# Patient Record
Sex: MALE | Race: BLACK OR AFRICAN AMERICAN | Employment: OTHER | URBAN - METROPOLITAN AREA
[De-identification: names, ages, dates, MRNs, and addresses within clinical notes are randomized per-mention and may not be internally consistent; named-entity substitution may affect disease eponyms.]

---

## 2017-09-11 ENCOUNTER — HOSPITAL ENCOUNTER (OUTPATIENT)
Age: 37
Discharge: HOME | End: 2017-09-11
Payer: COMMERCIAL

## 2017-09-11 ENCOUNTER — OFFICE VISIT (OUTPATIENT)
Dept: FAMILY MEDICINE CLINIC | Age: 37
End: 2017-09-11

## 2017-09-11 ENCOUNTER — HOSPITAL ENCOUNTER (OUTPATIENT)
Dept: GENERAL RADIOLOGY | Age: 37
Discharge: HOME | End: 2017-09-11
Payer: COMMERCIAL

## 2017-09-11 VITALS
HEIGHT: 72 IN | SYSTOLIC BLOOD PRESSURE: 110 MMHG | HEART RATE: 76 BPM | OXYGEN SATURATION: 98 % | WEIGHT: 199 LBS | TEMPERATURE: 98.2 F | BODY MASS INDEX: 26.95 KG/M2 | RESPIRATION RATE: 20 BRPM | DIASTOLIC BLOOD PRESSURE: 64 MMHG

## 2017-09-11 DIAGNOSIS — G89.29 CHRONIC LOW BACK PAIN WITH RIGHT-SIDED SCIATICA, UNSPECIFIED BACK PAIN LATERALITY: Primary | ICD-10-CM

## 2017-09-11 DIAGNOSIS — M25.551 RIGHT HIP PAIN: ICD-10-CM

## 2017-09-11 DIAGNOSIS — M54.41 CHRONIC LOW BACK PAIN WITH RIGHT-SIDED SCIATICA, UNSPECIFIED BACK PAIN LATERALITY: Primary | ICD-10-CM

## 2017-09-11 PROCEDURE — 99203 OFFICE O/P NEW LOW 30 MIN: CPT | Performed by: FAMILY MEDICINE

## 2017-09-11 PROCEDURE — 73521 X-RAY EXAM HIPS BI 2 VIEWS: CPT

## 2017-09-11 ASSESSMENT — PATIENT HEALTH QUESTIONNAIRE - PHQ9
SUM OF ALL RESPONSES TO PHQ9 QUESTIONS 1 & 2: 0
2. FEELING DOWN, DEPRESSED OR HOPELESS: 0
1. LITTLE INTEREST OR PLEASURE IN DOING THINGS: 0
SUM OF ALL RESPONSES TO PHQ QUESTIONS 1-9: 0

## 2017-09-15 ASSESSMENT — ENCOUNTER SYMPTOMS
COUGH: 0
CHEST TIGHTNESS: 0
BACK PAIN: 1
WHEEZING: 0
CONSTIPATION: 0
DIARRHEA: 0
SHORTNESS OF BREATH: 0
ABDOMINAL PAIN: 0
APNEA: 0
VOMITING: 0

## 2017-09-21 PROBLEM — M51.36 LUMBAR DEGENERATIVE DISC DISEASE: Status: ACTIVE | Noted: 2017-09-21

## 2017-09-21 PROBLEM — M54.16 LUMBAR RADICULAR PAIN: Status: ACTIVE | Noted: 2017-09-21

## 2017-09-21 PROBLEM — M51.369 LUMBAR DEGENERATIVE DISC DISEASE: Status: ACTIVE | Noted: 2017-09-21

## 2019-11-19 ENCOUNTER — OFFICE VISIT (OUTPATIENT)
Dept: FAMILY MEDICINE CLINIC | Age: 39
End: 2019-11-19
Payer: COMMERCIAL

## 2019-11-19 ENCOUNTER — HOSPITAL ENCOUNTER (OUTPATIENT)
Dept: GENERAL RADIOLOGY | Age: 39
Discharge: HOME | End: 2019-11-21
Payer: COMMERCIAL

## 2019-11-19 ENCOUNTER — HOSPITAL ENCOUNTER (OUTPATIENT)
Dept: LAB | Age: 39
Discharge: HOME | End: 2019-11-19
Payer: COMMERCIAL

## 2019-11-19 ENCOUNTER — HOSPITAL ENCOUNTER (OUTPATIENT)
Age: 39
Discharge: HOME | End: 2019-11-21
Payer: MEDICARE

## 2019-11-19 VITALS
HEIGHT: 72 IN | OXYGEN SATURATION: 97 % | WEIGHT: 208 LBS | TEMPERATURE: 97 F | HEART RATE: 115 BPM | RESPIRATION RATE: 18 BRPM | BODY MASS INDEX: 28.17 KG/M2 | SYSTOLIC BLOOD PRESSURE: 114 MMHG | DIASTOLIC BLOOD PRESSURE: 70 MMHG

## 2019-11-19 DIAGNOSIS — M25.521 RIGHT ELBOW PAIN: Primary | ICD-10-CM

## 2019-11-19 DIAGNOSIS — R10.33 ACUTE PERIUMBILICAL PAIN: ICD-10-CM

## 2019-11-19 DIAGNOSIS — M25.521 RIGHT ELBOW PAIN: ICD-10-CM

## 2019-11-19 LAB
ALBUMIN SERPL-MCNC: 4.5 G/DL (ref 3.5–4.6)
ALP BLD-CCNC: 88 U/L (ref 35–104)
ALT SERPL-CCNC: 35 U/L (ref 0–41)
AMYLASE: 44 U/L (ref 22–93)
ANION GAP SERPL CALCULATED.3IONS-SCNC: 12 MEQ/L (ref 9–15)
AST SERPL-CCNC: 25 U/L (ref 0–40)
BASOPHILS ABSOLUTE: 0 K/UL (ref 0–0.2)
BASOPHILS RELATIVE PERCENT: 0.8 %
BILIRUB SERPL-MCNC: 2.2 MG/DL (ref 0.2–0.7)
BUN BLDV-MCNC: 14 MG/DL (ref 6–20)
CALCIUM SERPL-MCNC: 9.4 MG/DL (ref 8.5–9.9)
CHLORIDE BLD-SCNC: 102 MEQ/L (ref 95–107)
CO2: 26 MEQ/L (ref 20–31)
CREAT SERPL-MCNC: 0.69 MG/DL (ref 0.7–1.2)
EOSINOPHILS ABSOLUTE: 0.1 K/UL (ref 0–0.7)
EOSINOPHILS RELATIVE PERCENT: 1.9 %
GFR AFRICAN AMERICAN: >60
GFR NON-AFRICAN AMERICAN: >60
GLOBULIN: 3.2 G/DL (ref 2.3–3.5)
GLUCOSE BLD-MCNC: 97 MG/DL (ref 70–99)
HCT VFR BLD CALC: 53.4 % (ref 42–52)
HEMOGLOBIN: 18.4 G/DL (ref 14–18)
LIPASE: 27 U/L (ref 12–95)
LYMPHOCYTES ABSOLUTE: 1.4 K/UL (ref 1–4.8)
LYMPHOCYTES RELATIVE PERCENT: 31.4 %
MCH RBC QN AUTO: 36.1 PG (ref 27–31.3)
MCHC RBC AUTO-ENTMCNC: 34.5 % (ref 33–37)
MCV RBC AUTO: 104.8 FL (ref 80–100)
MONOCYTES ABSOLUTE: 0.5 K/UL (ref 0.2–0.8)
MONOCYTES RELATIVE PERCENT: 11.9 %
NEUTROPHILS ABSOLUTE: 2.5 K/UL (ref 1.4–6.5)
NEUTROPHILS RELATIVE PERCENT: 54 %
PDW BLD-RTO: 13.4 % (ref 11.5–14.5)
PLATELET # BLD: 275 K/UL (ref 130–400)
POTASSIUM SERPL-SCNC: 4.3 MEQ/L (ref 3.4–4.9)
RBC # BLD: 5.1 M/UL (ref 4.7–6.1)
SODIUM BLD-SCNC: 140 MEQ/L (ref 135–144)
TOTAL PROTEIN: 7.7 G/DL (ref 6.3–8)
WBC # BLD: 4.6 K/UL (ref 4.8–10.8)

## 2019-11-19 PROCEDURE — 82150 ASSAY OF AMYLASE: CPT

## 2019-11-19 PROCEDURE — 36415 COLL VENOUS BLD VENIPUNCTURE: CPT

## 2019-11-19 PROCEDURE — 80053 COMPREHEN METABOLIC PANEL: CPT

## 2019-11-19 PROCEDURE — 73080 X-RAY EXAM OF ELBOW: CPT

## 2019-11-19 PROCEDURE — 99213 OFFICE O/P EST LOW 20 MIN: CPT | Performed by: PHYSICIAN ASSISTANT

## 2019-11-19 PROCEDURE — 83690 ASSAY OF LIPASE: CPT

## 2019-11-19 PROCEDURE — 85025 COMPLETE CBC W/AUTO DIFF WBC: CPT

## 2019-11-19 RX ORDER — CEPHALEXIN 500 MG/1
500 CAPSULE ORAL 3 TIMES DAILY
Qty: 30 CAPSULE | Refills: 0 | Status: SHIPPED | OUTPATIENT
Start: 2019-11-19 | End: 2019-11-29

## 2019-11-19 ASSESSMENT — PATIENT HEALTH QUESTIONNAIRE - PHQ9
2. FEELING DOWN, DEPRESSED OR HOPELESS: 0
SUM OF ALL RESPONSES TO PHQ QUESTIONS 1-9: 0
1. LITTLE INTEREST OR PLEASURE IN DOING THINGS: 0
SUM OF ALL RESPONSES TO PHQ9 QUESTIONS 1 & 2: 0
SUM OF ALL RESPONSES TO PHQ QUESTIONS 1-9: 0

## 2019-11-19 ASSESSMENT — ENCOUNTER SYMPTOMS
ABDOMINAL DISTENTION: 0
VOMITING: 0
SHORTNESS OF BREATH: 0
NAUSEA: 0
DIARRHEA: 0
COUGH: 0
CONSTIPATION: 0
BLOOD IN STOOL: 0
ABDOMINAL PAIN: 1

## 2019-11-20 ENCOUNTER — TELEPHONE (OUTPATIENT)
Dept: FAMILY MEDICINE CLINIC | Age: 39
End: 2019-11-20

## 2019-11-25 ENCOUNTER — TELEPHONE (OUTPATIENT)
Dept: FAMILY MEDICINE CLINIC | Age: 39
End: 2019-11-25

## 2019-11-25 ENCOUNTER — OFFICE VISIT (OUTPATIENT)
Dept: FAMILY MEDICINE CLINIC | Age: 39
End: 2019-11-25
Payer: MEDICARE

## 2019-11-25 ENCOUNTER — HOSPITAL ENCOUNTER (OUTPATIENT)
Dept: CT IMAGING | Age: 39
Discharge: HOME | End: 2019-11-27
Payer: MEDICARE

## 2019-11-25 VITALS
OXYGEN SATURATION: 96 % | TEMPERATURE: 97.8 F | WEIGHT: 213.4 LBS | DIASTOLIC BLOOD PRESSURE: 78 MMHG | BODY MASS INDEX: 28.91 KG/M2 | HEIGHT: 72 IN | SYSTOLIC BLOOD PRESSURE: 118 MMHG | RESPIRATION RATE: 16 BRPM | HEART RATE: 75 BPM

## 2019-11-25 DIAGNOSIS — D75.89 MACROCYTOSIS: ICD-10-CM

## 2019-11-25 DIAGNOSIS — N20.0 NEPHROLITHIASIS: ICD-10-CM

## 2019-11-25 DIAGNOSIS — R10.31 ACUTE RIGHT LOWER QUADRANT PAIN: ICD-10-CM

## 2019-11-25 DIAGNOSIS — Q63.9 RENAL ANOMALY: ICD-10-CM

## 2019-11-25 DIAGNOSIS — L03.114 LEFT ARM CELLULITIS: ICD-10-CM

## 2019-11-25 DIAGNOSIS — K52.9 ENTERITIS: ICD-10-CM

## 2019-11-25 DIAGNOSIS — R10.31 ACUTE RIGHT LOWER QUADRANT PAIN: Primary | ICD-10-CM

## 2019-11-25 DIAGNOSIS — N30.00 ACUTE CYSTITIS WITHOUT HEMATURIA: Primary | ICD-10-CM

## 2019-11-25 PROCEDURE — 99214 OFFICE O/P EST MOD 30 MIN: CPT | Performed by: FAMILY MEDICINE

## 2019-11-25 PROCEDURE — 6360000004 HC RX CONTRAST MEDICATION: Performed by: FAMILY MEDICINE

## 2019-11-25 PROCEDURE — 2500000003 HC RX 250 WO HCPCS: Performed by: FAMILY MEDICINE

## 2019-11-25 PROCEDURE — 74177 CT ABD & PELVIS W/CONTRAST: CPT

## 2019-11-25 RX ORDER — CIPROFLOXACIN 500 MG/1
500 TABLET, FILM COATED ORAL 2 TIMES DAILY
Qty: 20 TABLET | Refills: 0 | Status: SHIPPED | OUTPATIENT
Start: 2019-11-25 | End: 2019-12-05

## 2019-11-25 RX ORDER — METRONIDAZOLE 500 MG/1
500 TABLET ORAL 3 TIMES DAILY
Qty: 30 TABLET | Refills: 0 | Status: SHIPPED | OUTPATIENT
Start: 2019-11-25 | End: 2019-12-05

## 2019-11-25 RX ORDER — TAMSULOSIN HYDROCHLORIDE 0.4 MG/1
0.4 CAPSULE ORAL DAILY
Qty: 30 CAPSULE | Refills: 0 | Status: SHIPPED | OUTPATIENT
Start: 2019-11-25 | End: 2019-12-23 | Stop reason: ALTCHOICE

## 2019-11-25 RX ADMIN — IOPAMIDOL 100 ML: 612 INJECTION, SOLUTION INTRAVENOUS at 13:00

## 2019-11-25 RX ADMIN — BARIUM SULFATE 450 ML: 20 SUSPENSION ORAL at 11:57

## 2019-11-25 ASSESSMENT — ENCOUNTER SYMPTOMS
SHORTNESS OF BREATH: 0
HEMATOCHEZIA: 0
ABDOMINAL PAIN: 1
BLOOD IN STOOL: 0
BELCHING: 0
VOMITING: 0
CHEST TIGHTNESS: 0
CONSTIPATION: 0
FLATUS: 0
NAUSEA: 0
APNEA: 0
COUGH: 0
DIARRHEA: 0

## 2019-11-26 NOTE — TELEPHONE ENCOUNTER
Did The patient come in to leave a urine sample? I believe he is aware of the CT scan results.  I will have the patient discontinue his Keflex if he is going to start antibiotics sent in yesterday

## 2019-12-02 ENCOUNTER — HOSPITAL ENCOUNTER (OUTPATIENT)
Dept: ULTRASOUND IMAGING | Age: 39
Discharge: HOME | End: 2019-12-04
Payer: MEDICARE

## 2019-12-02 DIAGNOSIS — Q63.9 RENAL ANOMALY: ICD-10-CM

## 2019-12-02 PROCEDURE — 76775 US EXAM ABDO BACK WALL LIM: CPT

## 2019-12-11 ENCOUNTER — TELEPHONE (OUTPATIENT)
Dept: UROLOGY | Age: 39
End: 2019-12-11

## 2019-12-11 DIAGNOSIS — N20.0 KIDNEY STONE: Primary | ICD-10-CM

## 2019-12-16 ENCOUNTER — HOSPITAL ENCOUNTER (OUTPATIENT)
Dept: GENERAL RADIOLOGY | Age: 39
Discharge: HOME | End: 2019-12-18
Payer: MEDICARE

## 2019-12-16 ENCOUNTER — OFFICE VISIT (OUTPATIENT)
Dept: UROLOGY | Age: 39
End: 2019-12-16
Payer: MEDICARE

## 2019-12-16 VITALS
WEIGHT: 205 LBS | HEIGHT: 72 IN | HEART RATE: 77 BPM | BODY MASS INDEX: 27.77 KG/M2 | DIASTOLIC BLOOD PRESSURE: 82 MMHG | SYSTOLIC BLOOD PRESSURE: 118 MMHG

## 2019-12-16 DIAGNOSIS — N20.0 KIDNEY STONE: Primary | ICD-10-CM

## 2019-12-16 DIAGNOSIS — R31.29 MICROHEMATURIA: ICD-10-CM

## 2019-12-16 DIAGNOSIS — N20.0 KIDNEY STONE: ICD-10-CM

## 2019-12-16 LAB
BILIRUBIN, POC: ABNORMAL
BLOOD URINE, POC: ABNORMAL
CLARITY, POC: CLEAR
COLOR, POC: YELLOW
GLUCOSE URINE, POC: ABNORMAL
KETONES, POC: ABNORMAL
LEUKOCYTE EST, POC: ABNORMAL
NITRITE, POC: ABNORMAL
PH, POC: 7
PROTEIN, POC: ABNORMAL
SPECIFIC GRAVITY, POC: 1.02
UROBILINOGEN, POC: 0.2

## 2019-12-16 PROCEDURE — 81003 URINALYSIS AUTO W/O SCOPE: CPT | Performed by: UROLOGY

## 2019-12-16 PROCEDURE — 74018 RADEX ABDOMEN 1 VIEW: CPT

## 2019-12-16 PROCEDURE — 99203 OFFICE O/P NEW LOW 30 MIN: CPT | Performed by: UROLOGY

## 2019-12-18 LAB — URINE CULTURE, ROUTINE: NORMAL

## 2019-12-23 ENCOUNTER — PROCEDURE VISIT (OUTPATIENT)
Dept: UROLOGY | Age: 39
End: 2019-12-23
Payer: MEDICARE

## 2019-12-23 VITALS
WEIGHT: 205 LBS | HEART RATE: 61 BPM | SYSTOLIC BLOOD PRESSURE: 108 MMHG | BODY MASS INDEX: 27.77 KG/M2 | DIASTOLIC BLOOD PRESSURE: 76 MMHG | HEIGHT: 72 IN

## 2019-12-23 DIAGNOSIS — R33.9 URINARY RETENTION: ICD-10-CM

## 2019-12-23 DIAGNOSIS — R31.29 MICROHEMATURIA: Primary | ICD-10-CM

## 2019-12-23 LAB
BILIRUBIN, POC: NORMAL
BLOOD URINE, POC: NORMAL
CLARITY, POC: CLEAR
COLOR, POC: YELLOW
GLUCOSE URINE, POC: NORMAL
KETONES, POC: NORMAL
LEUKOCYTE EST, POC: NORMAL
NITRITE, POC: NORMAL
PH, POC: 6.5
POST VOID RESIDUAL (PVR): 134 ML
PROTEIN, POC: NORMAL
SPECIFIC GRAVITY, POC: <=1.005
UROBILINOGEN, POC: 0.2

## 2019-12-23 PROCEDURE — 81003 URINALYSIS AUTO W/O SCOPE: CPT | Performed by: UROLOGY

## 2019-12-23 PROCEDURE — 51798 US URINE CAPACITY MEASURE: CPT | Performed by: UROLOGY

## 2019-12-23 PROCEDURE — 52000 CYSTOURETHROSCOPY: CPT | Performed by: UROLOGY

## 2019-12-23 RX ORDER — SULFAMETHOXAZOLE AND TRIMETHOPRIM 800; 160 MG/1; MG/1
1 TABLET ORAL ONCE
Qty: 1 TABLET | Refills: 0 | COMMUNITY
Start: 2019-12-23 | End: 2019-12-23

## 2020-01-07 ENCOUNTER — OFFICE VISIT (OUTPATIENT)
Dept: FAMILY MEDICINE CLINIC | Age: 40
End: 2020-01-07
Payer: MEDICARE

## 2020-01-07 ENCOUNTER — HOSPITAL ENCOUNTER (OUTPATIENT)
Dept: LAB | Age: 40
Discharge: HOME | End: 2020-01-07
Payer: MEDICARE

## 2020-01-07 ENCOUNTER — HOSPITAL ENCOUNTER (OUTPATIENT)
Dept: CT IMAGING | Age: 40
Discharge: HOME | End: 2020-01-09
Payer: MEDICARE

## 2020-01-07 VITALS
DIASTOLIC BLOOD PRESSURE: 80 MMHG | RESPIRATION RATE: 14 BRPM | SYSTOLIC BLOOD PRESSURE: 128 MMHG | HEIGHT: 72 IN | BODY MASS INDEX: 28.53 KG/M2 | HEART RATE: 73 BPM | OXYGEN SATURATION: 98 % | WEIGHT: 210.6 LBS | TEMPERATURE: 97.4 F

## 2020-01-07 LAB
BASOPHILS ABSOLUTE: 0 K/UL (ref 0–0.2)
BASOPHILS RELATIVE PERCENT: 0.7 %
EOSINOPHILS ABSOLUTE: 0.1 K/UL (ref 0–0.7)
EOSINOPHILS RELATIVE PERCENT: 3 %
FOLATE: >20 NG/ML (ref 7.3–26.1)
HCT VFR BLD CALC: 49.1 % (ref 42–52)
HEMOGLOBIN: 17 G/DL (ref 14–18)
LYMPHOCYTES ABSOLUTE: 1.4 K/UL (ref 1–4.8)
LYMPHOCYTES RELATIVE PERCENT: 30.8 %
MCH RBC QN AUTO: 36.4 PG (ref 27–31.3)
MCHC RBC AUTO-ENTMCNC: 34.7 % (ref 33–37)
MCV RBC AUTO: 104.8 FL (ref 80–100)
MONOCYTES ABSOLUTE: 0.3 K/UL (ref 0.2–0.8)
MONOCYTES RELATIVE PERCENT: 5.8 %
NEUTROPHILS ABSOLUTE: 2.7 K/UL (ref 1.4–6.5)
NEUTROPHILS RELATIVE PERCENT: 59.7 %
PDW BLD-RTO: 13.3 % (ref 11.5–14.5)
PLATELET # BLD: 259 K/UL (ref 130–400)
RBC # BLD: 4.68 M/UL (ref 4.7–6.1)
VITAMIN B-12: 996 PG/ML (ref 232–1245)
WBC # BLD: 4.5 K/UL (ref 4.8–10.8)

## 2020-01-07 PROCEDURE — 82746 ASSAY OF FOLIC ACID SERUM: CPT

## 2020-01-07 PROCEDURE — 36415 COLL VENOUS BLD VENIPUNCTURE: CPT

## 2020-01-07 PROCEDURE — 85025 COMPLETE CBC W/AUTO DIFF WBC: CPT

## 2020-01-07 PROCEDURE — 99214 OFFICE O/P EST MOD 30 MIN: CPT | Performed by: FAMILY MEDICINE

## 2020-01-07 PROCEDURE — 2500000003 HC RX 250 WO HCPCS: Performed by: FAMILY MEDICINE

## 2020-01-07 PROCEDURE — 74177 CT ABD & PELVIS W/CONTRAST: CPT

## 2020-01-07 PROCEDURE — 6360000004 HC RX CONTRAST MEDICATION: Performed by: FAMILY MEDICINE

## 2020-01-07 PROCEDURE — 82607 VITAMIN B-12: CPT

## 2020-01-07 RX ADMIN — IOPAMIDOL 100 ML: 755 INJECTION, SOLUTION INTRAVENOUS at 18:16

## 2020-01-07 RX ADMIN — BARIUM SULFATE 450 ML: 20 SUSPENSION ORAL at 17:20

## 2020-01-07 ASSESSMENT — ENCOUNTER SYMPTOMS
CONSTIPATION: 0
ABDOMINAL PAIN: 1
DIARRHEA: 0
COUGH: 0
VOMITING: 0
CHEST TIGHTNESS: 0
SHORTNESS OF BREATH: 0
BLOOD IN STOOL: 0
NAUSEA: 0
APNEA: 0

## 2020-01-07 ASSESSMENT — PATIENT HEALTH QUESTIONNAIRE - PHQ9
SUM OF ALL RESPONSES TO PHQ QUESTIONS 1-9: 0
2. FEELING DOWN, DEPRESSED OR HOPELESS: 0
SUM OF ALL RESPONSES TO PHQ9 QUESTIONS 1 & 2: 0
1. LITTLE INTEREST OR PLEASURE IN DOING THINGS: 0
SUM OF ALL RESPONSES TO PHQ QUESTIONS 1-9: 0

## 2020-01-07 NOTE — PROGRESS NOTES
Subjective:      Patient ID: Marleny Carmichael is a 44 y.o. male who presents today for:     Chief Complaint   Patient presents with    Abdominal Pain     Patient presents with right sided pain that radiates to the upper part of his abdomen, is worse after walking his dog or being at work all day. HPI  Patient is a 59-year-old male presents today to follow-up on right-sided abdominal pain. CT scan ordered 1 month ago showed thickening within the small bowel on the right side. Patient was placed on antibiotics and had mild/moderate improvement but did not follow up or go  to the emergency room as directed. He  states the pain is currently a dull pain that is graded as a 3 out of 10. He denies any diarrhea, constipation or blood in his stool but states stools are always somewhat soft. He admits to some mild anal leakage but he denies any fever or chills. He states his symptoms are worse with activity or after work  Patient also states that he has been having recurrent right elbow pain. Antibiotics were helpful in resolving cellulitis but patient continues to have discomfort especially after using arm all day at work. He denies any numbness or tingling or swelling of the joint  Past Medical History:   Diagnosis Date    Anxiety     Depression     Hypertension     Lumbar degenerative disc disease 9/21/2017     Past Surgical History:   Procedure Laterality Date    BACK SURGERY N/A 2014    DENTAL SURGERY  2010    all    KNEE ARTHROSCOPY  1995    rt knee     History reviewed. No pertinent family history.   Social History     Socioeconomic History    Marital status: Single     Spouse name: Not on file    Number of children: Not on file    Years of education: Not on file    Highest education level: Not on file   Occupational History    Not on file   Social Needs    Financial resource strain: Not on file    Food insecurity:     Worry: Not on file     Inability: Not on file    Transportation needs: Medical: Not on file     Non-medical: Not on file   Tobacco Use    Smoking status: Current Every Day Smoker    Smokeless tobacco: Never Used    Tobacco comment: smokes marijuana every day   Substance and Sexual Activity    Alcohol use: Yes     Comment: 40-48 oz malt liquor/day    Drug use: Yes     Types: Marijuana     Comment: daily    Sexual activity: Not Currently     Partners: Female   Lifestyle    Physical activity:     Days per week: Not on file     Minutes per session: Not on file    Stress: Not on file   Relationships    Social connections:     Talks on phone: Not on file     Gets together: Not on file     Attends [employer] service: Not on file     Active member of club or organization: Not on file     Attends meetings of clubs or organizations: Not on file     Relationship status: Not on file    Intimate partner violence:     Fear of current or ex partner: Not on file     Emotionally abused: Not on file     Physically abused: Not on file     Forced sexual activity: Not on file   Other Topics Concern    Not on file   Social History Narrative    Not on file     Current Outpatient Medications on File Prior to Visit   Medication Sig Dispense Refill    atomoxetine (STRATTERA) 60 MG capsule Take 60 mg by mouth daily.  paroxetine (PAXIL) 40 MG tablet Take 40 mg by mouth every morning. No current facility-administered medications on file prior to visit. Allergies:  Patient has no known allergies. Review of Systems   Constitutional: Negative for activity change, appetite change and fatigue. Respiratory: Negative for apnea, cough, chest tightness and shortness of breath. Cardiovascular: Negative for chest pain, palpitations and leg swelling. Gastrointestinal: Positive for abdominal pain. Negative for blood in stool, constipation, diarrhea, nausea and vomiting. Musculoskeletal: Negative for arthralgias. Neurological: Negative for seizures and headaches. pain  Benign resolution of symptoms with antibiotics will have patient evaluated by colorectal surgery and obtain a repeat CT scan for further evaluation. Will follow up CBC. If leukocytosis, will have patient go to ED  - Ambulatory referral to Colorectal Surgery  - CT ABDOMEN PELVIS W IV CONTRAST Additional Contrast? Radiologist Recommendation; Future    3. Lateral epicondylitis (right)  -Orthopedic referral  -Rehab Exercises given      Return if symptoms worsen or fail to improve.     Meenakshi Jones MD

## 2020-01-10 ENCOUNTER — OFFICE VISIT (OUTPATIENT)
Dept: SURGERY | Age: 40
End: 2020-01-10
Payer: MEDICARE

## 2020-01-10 VITALS
TEMPERATURE: 96.2 F | OXYGEN SATURATION: 96 % | BODY MASS INDEX: 28.71 KG/M2 | WEIGHT: 212 LBS | HEIGHT: 72 IN | HEART RATE: 77 BPM

## 2020-01-10 PROCEDURE — 99203 OFFICE O/P NEW LOW 30 MIN: CPT | Performed by: COLON & RECTAL SURGERY

## 2020-01-10 ASSESSMENT — ENCOUNTER SYMPTOMS
VOMITING: 0
DIARRHEA: 0
SHORTNESS OF BREATH: 0
COLOR CHANGE: 0
CONSTIPATION: 0
ABDOMINAL DISTENTION: 0
APNEA: 0
ABDOMINAL PAIN: 1

## 2020-01-10 NOTE — PROGRESS NOTES
Subjective:      Patient ID: Kingston Husain is a 44 y.o. male who presents for:  Chief Complaint   Patient presents with    New Patient       This is a 70-year-old male who was referred for right-sided abdominal pain for the past 30 days. It is worse upon activity. It never subsides completely. I reviewed 2 CAT scans 1 from November 2019 as well as 1 from 3 days ago. No abnormalities encountered. He does have a visualized appendix that does not appear dilated nor any signs of inflammation. Gallbladder appears normal in both studies. I reviewed blood work that was done recently through Dr. Naya Silva. He denies any bowel irregularities. Denies fever.       Past Medical History:   Diagnosis Date    Anxiety     Depression     Hypertension     Lumbar degenerative disc disease 9/21/2017     Past Surgical History:   Procedure Laterality Date    BACK SURGERY N/A 2014   [facility] DENTAL SURGERY  2010    all    KNEE ARTHROSCOPY  1995    rt knee     Social History     Socioeconomic History    Marital status: Single     Spouse name: Not on file    Number of children: Not on file    Years of education: Not on file    Highest education level: Not on file   Occupational History    Not on file   Social Needs    Financial resource strain: Not on file    Food insecurity:     Worry: Not on file     Inability: Not on file    Transportation needs:     Medical: Not on file     Non-medical: Not on file   Tobacco Use    Smoking status: Current Every Day Smoker    Smokeless tobacco: Never Used    Tobacco comment: smokes marijuana every day   Substance and Sexual Activity    Alcohol use: Yes     Comment: 40-48 oz malt liquor/day    Drug use: Yes     Types: Marijuana     Comment: daily    Sexual activity: Not Currently     Partners: Female   Lifestyle    Physical activity:     Days per week: Not on file     Minutes per session: Not on file    Stress: Not on file   Relationships    Social connections:     Talks on phone: Not on file     Gets together: Not on file     Attends Catholic service: Not on file     Active member of club or organization: Not on file     Attends meetings of clubs or organizations: Not on file     Relationship status: Not on file    Intimate partner violence:     Fear of current or ex partner: Not on file     Emotionally abused: Not on file     Physically abused: Not on file     Forced sexual activity: Not on file   Other Topics Concern    Not on file   Social History Narrative    Not on file     History reviewed. No pertinent family history. Allergies:  Patient has no known allergies. Review of Systems   Constitutional: Negative for activity change, chills, fatigue, fever and unexpected weight change. HENT: Negative for congestion. Respiratory: Negative for apnea and shortness of breath. Gastrointestinal: Positive for abdominal pain. Negative for abdominal distention, constipation, diarrhea and vomiting. Genitourinary: Negative for difficulty urinating. Musculoskeletal: Negative for arthralgias. Skin: Negative for color change. Neurological: Negative for dizziness and headaches. Hematological: Does not bruise/bleed easily. Psychiatric/Behavioral: Negative for agitation and confusion. Objective:    Pulse 77   Temp 96.2 °F (35.7 °C) (Temporal)   Ht 6' (1.829 m)   Wt 212 lb (96.2 kg)   SpO2 96%   BMI 28.75 kg/m²     Physical Exam  Constitutional:       General: He is not in acute distress. Appearance: He is well-developed. He is not diaphoretic. HENT:      Head: Normocephalic and atraumatic. Eyes:      Pupils: Pupils are equal, round, and reactive to light. Neck:      Musculoskeletal: Normal range of motion. Cardiovascular:      Rate and Rhythm: Normal rate and regular rhythm. Heart sounds: Normal heart sounds. Pulmonary:      Effort: Pulmonary effort is normal. No respiratory distress. Breath sounds: Normal breath sounds. No wheezing. Abdominal:      General: There is no distension. Palpations: Abdomen is soft. Tenderness: There is no tenderness. There is no guarding. Hernia: No hernia is present. Musculoskeletal: Normal range of motion. General: No tenderness. Skin:     General: Skin is warm and dry. Findings: No erythema or rash. Neurological:      Mental Status: He is alert and oriented to person, place, and time. Psychiatric:         Behavior: Behavior normal.         Thought Content: Thought content normal.         Judgment: Judgment normal.              Assessment/Plan:          Diagnosis Orders   1. Right lower quadrant abdominal pain       At this time there are no identifiable surgical issues. I have recommended that if he wishes to continue with investigation, I would recommend a colonoscopy which could be performed to evaluate the right colon as well as the cecum. If pain persist, I even recommended possible laparoscopy and appendectomy for possible issues related to condition described as chronic appendicitis. I have gone over the treatment options for him to evaluate this pain further. He wishes to think about these options and get back to me regarding whether he chooses to proceed with any further invasive investigation. Please note this report has beenpartially produced using speech recognition software and may cause contain errors related to that system including grammar, punctuation and spelling as well as words and phrases that may seem inappropriate.  If there arequestions or concerns please feel free to contact me to clarify

## 2020-01-14 ENCOUNTER — TELEPHONE (OUTPATIENT)
Dept: FAMILY MEDICINE CLINIC | Age: 40
End: 2020-01-14

## 2020-01-27 ENCOUNTER — OFFICE VISIT (OUTPATIENT)
Dept: ORTHOPEDIC SURGERY | Age: 40
End: 2020-01-27
Payer: MEDICARE

## 2020-01-27 VITALS
HEIGHT: 72 IN | BODY MASS INDEX: 28.71 KG/M2 | OXYGEN SATURATION: 99 % | WEIGHT: 212 LBS | TEMPERATURE: 96.9 F | HEART RATE: 78 BPM

## 2020-01-27 PROCEDURE — L3760 EO ADJ JT PREFAB CUSTOM FIT: HCPCS | Performed by: ORTHOPAEDIC SURGERY

## 2020-01-27 PROCEDURE — 99202 OFFICE O/P NEW SF 15 MIN: CPT | Performed by: ORTHOPAEDIC SURGERY

## 2020-01-27 RX ORDER — IBUPROFEN 600 MG/1
TABLET ORAL
COMMUNITY
Start: 2006-11-07 | End: 2021-07-16

## 2020-01-27 NOTE — PATIENT INSTRUCTIONS
Wear the brace on your right forearm whenever using her hand. Do not rest or sleep with the brace on. Ice your elbow frequently. Physical therapy for your right elbow was ordered. If you are having any significant symptoms in 6 weeks follow-up at that time.

## 2020-03-02 ENCOUNTER — HOSPITAL ENCOUNTER (OUTPATIENT)
Dept: OCCUPATIONAL THERAPY | Age: 40
Setting detail: THERAPIES SERIES
Discharge: HOME | End: 2020-03-02
Payer: MEDICARE

## 2020-03-02 PROCEDURE — 97165 OT EVAL LOW COMPLEX 30 MIN: CPT

## 2020-03-02 PROCEDURE — 97035 APP MDLTY 1+ULTRASOUND EA 15: CPT

## 2020-03-02 PROCEDURE — 97530 THERAPEUTIC ACTIVITIES: CPT

## 2020-03-02 ASSESSMENT — PAIN SCALES - GENERAL: PAINLEVEL_OUTOF10: 2

## 2020-03-02 ASSESSMENT — PAIN DESCRIPTION - LOCATION: LOCATION: ELBOW

## 2020-03-02 ASSESSMENT — PAIN DESCRIPTION - ORIENTATION: ORIENTATION: RIGHT

## 2020-03-02 ASSESSMENT — PAIN DESCRIPTION - FREQUENCY: FREQUENCY: OTHER (COMMENT)

## 2020-03-02 ASSESSMENT — PAIN DESCRIPTION - PAIN TYPE: TYPE: CHRONIC PAIN

## 2020-03-02 ASSESSMENT — PAIN DESCRIPTION - DESCRIPTORS: DESCRIPTORS: NUMBNESS;TINGLING;ACHING

## 2020-03-02 NOTE — PROGRESS NOTES
Occupational Therapy  Occupational Therapy Initial Assessment  Date:  3/2/2020    Patient Name: Leodan Obando  MRN: [id number]     :  1980          Restrictions  Restrictions/Precautions  Restrictions/Precautions: General Precautions  Required Braces or Orthoses?: Yes(Tennis elbow brace)  Subjective   General  Chart Reviewed: Yes  Patient assessed for rehabilitation services?: Yes  Additional Pertinent Hx: pain has been x2-3mo. Pt reports he first went to a walk-in clinic where they treated him for an infection; however, pt still was having the same pains- he then went to see an orthopedic Dr. where he was then diagnosed with R lateral epicondylitis. Referring Practitioner: Dr. Alayna Pineda  Diagnosis: R Lateral epicondylitis  OT Visit Information  Onset Date: 20  Total # of Visits Approved: 12  Total # of Visits to Date: 1  No Show: 0  Canceled Appointment: 0  Subjective  Subjective: Pt on time and agreeable to OT eval/tx  Pain Assessment  Pain Assessment: 0-10  Pain Level: 2(2/10 at rest. Pain increases to 7/10 with activities)  Pain Type: Chronic pain(Pt reports his pain has been occuring for 2-3mo)  Pain Location: Elbow  Pain Orientation: Right  Pain Radiating Towards: forearm down to hand  Pain Descriptors: Numbness;Tingling;Aching(numbness and tingling intermittent, pain is sharp with activity. 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  Pt reports he and co-owner are the

## 2020-03-09 ENCOUNTER — HOSPITAL ENCOUNTER (OUTPATIENT)
Dept: OCCUPATIONAL THERAPY | Age: 40
Setting detail: THERAPIES SERIES
Discharge: HOME | End: 2020-03-09
Payer: MEDICARE

## 2020-03-09 PROCEDURE — 97110 THERAPEUTIC EXERCISES: CPT

## 2020-03-09 PROCEDURE — 97760 ORTHOTIC MGMT&TRAING 1ST ENC: CPT

## 2020-03-09 PROCEDURE — 97035 APP MDLTY 1+ULTRASOUND EA 15: CPT

## 2020-03-09 PROCEDURE — 97530 THERAPEUTIC ACTIVITIES: CPT

## 2020-03-09 NOTE — PROGRESS NOTES
Occupational Therapy  Daily Treatment Note  Date: 3/9/2020  Patient Name: Clemente Buck  :  1980  MRN: [id number]       Subjective   General  Diagnosis: R Lateral epicondylitis  OT Visit Information  Total # of Visits Approved: 12  Total # of Visits to Date: 2  No Show: 0  Canceled Appointment: 0    Pt. Stated his pain is achy and is 2/10 in right elbow. Treatment Activities:      MHP applied to right elbow to decrease stiffness   warm up- wrist in all directions and motions to decrease stiffness slow movements hold for 3-5 seconds each 10 x   Trained and educated pt. In wrist extension and flexion resting forearm on mat and holding in flexion for 5 seconds and extension 5 seconds 15 reps      Forearm on mat rotate wrist in supination and pronation hold each for 5 seconds 10 x to increase ROM and decrease stiffness       US- 3 MHZ, 50%, small head, 1.0, 15 minutes on right elbow and the side of the elbow to decrease pain and swelling  Measured, cut, and applied KT to right forearm and elbow with 35%-45% tension to give slight stretch and decrease swelling and pain  Remove in 5 days  Educated pt. In KT wear and benefits  Trained and educated pt. In hand in supination laying on the table in front of pt. And pushing elbow upwards and hold for 5 seconds with elbow in extension 15 x to increase elbow extension  Trained and educated pt. In HEP on all three of stretches/exercises we did together during the session  Instructed pt. To bring back papers next visit to add on more stretches and exercises  Re-fitted and adjusted pt.'s elbow splint  Trained and educated pt. In proper wear of the splint to decrease pain while wearing it  Cut and gave pt.  Arm sleeves to wear under his splint  Answered pt.'s questions and concerns                                                                                Assessment      Discharge Recommendations: Outpatient OT  REQUIRES OT FOLLOW UP: Yes       Applied MHP to decrease

## 2020-03-23 ENCOUNTER — APPOINTMENT (OUTPATIENT)
Dept: OCCUPATIONAL THERAPY | Age: 40
End: 2020-03-23
Payer: MEDICARE

## 2021-05-11 ENCOUNTER — OFFICE VISIT (OUTPATIENT)
Dept: FAMILY MEDICINE CLINIC | Age: 41
End: 2021-05-11
Payer: MEDICARE

## 2021-05-11 VITALS
SYSTOLIC BLOOD PRESSURE: 130 MMHG | DIASTOLIC BLOOD PRESSURE: 88 MMHG | TEMPERATURE: 96.2 F | WEIGHT: 232.4 LBS | OXYGEN SATURATION: 97 % | HEART RATE: 78 BPM | RESPIRATION RATE: 16 BRPM | BODY MASS INDEX: 30.8 KG/M2 | HEIGHT: 73 IN

## 2021-05-11 DIAGNOSIS — R10.11 CHRONIC RUQ PAIN: ICD-10-CM

## 2021-05-11 DIAGNOSIS — H92.03 OTALGIA OF BOTH EARS: Primary | ICD-10-CM

## 2021-05-11 DIAGNOSIS — R03.0 ELEVATED BP WITHOUT DIAGNOSIS OF HYPERTENSION: ICD-10-CM

## 2021-05-11 DIAGNOSIS — Z13.220 LIPID SCREENING: ICD-10-CM

## 2021-05-11 DIAGNOSIS — G89.29 CHRONIC RUQ PAIN: ICD-10-CM

## 2021-05-11 PROCEDURE — 99214 OFFICE O/P EST MOD 30 MIN: CPT | Performed by: FAMILY MEDICINE

## 2021-05-11 RX ORDER — AMOXICILLIN AND CLAVULANATE POTASSIUM 875; 125 MG/1; MG/1
1 TABLET, FILM COATED ORAL 2 TIMES DAILY
Qty: 20 TABLET | Refills: 0 | Status: SHIPPED | OUTPATIENT
Start: 2021-05-11 | End: 2021-05-21

## 2021-05-11 SDOH — ECONOMIC STABILITY: FOOD INSECURITY: WITHIN THE PAST 12 MONTHS, YOU WORRIED THAT YOUR FOOD WOULD RUN OUT BEFORE YOU GOT MONEY TO BUY MORE.: NEVER TRUE

## 2021-05-11 SDOH — ECONOMIC STABILITY: TRANSPORTATION INSECURITY
IN THE PAST 12 MONTHS, HAS LACK OF TRANSPORTATION KEPT YOU FROM MEETINGS, WORK, OR FROM GETTING THINGS NEEDED FOR DAILY LIVING?: NO

## 2021-05-11 SDOH — ECONOMIC STABILITY: TRANSPORTATION INSECURITY
IN THE PAST 12 MONTHS, HAS THE LACK OF TRANSPORTATION KEPT YOU FROM MEDICAL APPOINTMENTS OR FROM GETTING MEDICATIONS?: NO

## 2021-05-11 ASSESSMENT — PATIENT HEALTH QUESTIONNAIRE - PHQ9
SUM OF ALL RESPONSES TO PHQ9 QUESTIONS 1 & 2: 0
SUM OF ALL RESPONSES TO PHQ QUESTIONS 1-9: 0
2. FEELING DOWN, DEPRESSED OR HOPELESS: 0
SUM OF ALL RESPONSES TO PHQ QUESTIONS 1-9: 0

## 2021-05-11 ASSESSMENT — ENCOUNTER SYMPTOMS
RHINORRHEA: 0
COUGH: 0
SORE THROAT: 0
APNEA: 0
NAUSEA: 0
FACIAL SWELLING: 0
SINUS PAIN: 0
CHEST TIGHTNESS: 0
VOMITING: 0
BLOOD IN STOOL: 0
DIARRHEA: 0
TROUBLE SWALLOWING: 0
CONSTIPATION: 0
SHORTNESS OF BREATH: 0
SINUS PRESSURE: 0
ABDOMINAL PAIN: 0

## 2021-05-11 NOTE — PROGRESS NOTES
Subjective:      Patient ID: Will Valles is a 36 y.o. male who presents today for:     Chief Complaint   Patient presents with    Wound Infection     Pt presents with pain in his ear states when it started x2 weeks ago he had a fever x2 days with pain in right ear, redness, swelling which traveled into his forhead and causes redness pain and a bump on the right side of forhead which than traveled into his left ear. Neck pain as well     Discuss Medications     would like to discuss you taking over medications states his psychiatrist is retiring    [facility] Maintenance     agrees to all bw declines pneumonia vaccine        Otalgia   There is pain in the right ear. This is a new problem. Episode onset: 2 weeks ago. The problem occurs constantly. The problem has been gradually improving. Maximum temperature: Only for the first 48 hours of onset. The fever has been present for 1 to 2 days. The pain is at a severity of 2/10. The pain is mild. Pertinent negatives include no abdominal pain, coughing, diarrhea, ear discharge, headaches, hearing loss, neck pain, rash, rhinorrhea, sore throat or vomiting. He has tried nothing for the symptoms. Patient also did follow-up with gastroenterology last year with plans to obtain a colonoscopy but due to COVID-19 patient did not follow-up and is requesting a new referral.       Past Medical History:   Diagnosis Date    Anxiety     Depression     Hypertension     Lumbar degenerative disc disease 9/21/2017    Lumbar radicular pain      Past Surgical History:   Procedure Laterality Date    BACK SURGERY N/A 2014   [id number] [facility] SURGERY  2010    all    KNEE ARTHROSCOPY  1995    rt knee     History reviewed. No pertinent family history.   Social History     Socioeconomic History    Marital status: Single     Spouse name: Not on file    Number of children: Not on file    Years of education: Not on file    Highest education level: Not on file   Occupational History    Occupation: owns [employer]   Social Needs    Financial resource strain: Not hard at all   Karon-Kailyn insecurity     Worry: Never true     Inability: Never true   [employer] needs     Medical: No     Non-medical: No   Tobacco Use    Smoking status: Current Every Day Smoker    Smokeless tobacco: Never Used    Tobacco comment: smokes marijuana every day   Substance and Sexual Activity    Alcohol use: Yes     Comment: 40-48 oz malt liquor/day    Drug use: Yes     Types: Marijuana     Comment: daily    Sexual activity: Not Currently     Partners: Female   Lifestyle    Physical activity     Days per week: Not on file     Minutes per session: Not on file    Stress: Not on file   Relationships    Social connections     Talks on phone: Not on file     Gets together: Not on file     Attends Anabaptist service: Not on file     Active member of club or organization: Not on file     Attends meetings of clubs or organizations: Not on file     Relationship status: Not on file    Intimate partner violence     Fear of current or ex partner: Not on file     Emotionally abused: Not on file     Physically abused: Not on file     Forced sexual activity: Not on file   Other Topics Concern    Not on file   Social History Narrative    Not on file     Current Outpatient Medications on File Prior to Visit   Medication Sig Dispense Refill    ibuprofen (ADVIL;MOTRIN) 600 MG tablet Take by mouth      atomoxetine (STRATTERA) 60 MG capsule Take 60 mg by mouth daily.  paroxetine (PAXIL) 40 MG tablet Take 40 mg by mouth every morning. No current facility-administered medications on file prior to visit. Allergies:  Patient has no known allergies. Review of Systems   Constitutional: Negative for activity change, appetite change and fatigue. HENT: Positive for ear pain.  Negative for congestion, dental problem, drooling, ear discharge, facial swelling, hearing loss, mouth sores, nosebleeds, postnasal drip, rhinorrhea, tenderness. Abdominal:      General: Abdomen is flat. Bowel sounds are normal.      Palpations: Abdomen is soft. Tenderness: There is no abdominal tenderness. Skin:     General: Skin is warm and dry. Neurological:      Mental Status: He is alert and oriented to person, place, and time. Psychiatric:         Behavior: Behavior normal.         Thought Content: Thought content normal.         Judgment: Judgment normal.         Assessment & Plan:     1. Otalgia of both ears  More consistent with right otitis media   we will treat with Augmentin due to persistence and duration of symptoms as well as physical findings. Patient to return if no improvement  - amoxicillin-clavulanate (AUGMENTIN) 875-125 MG per tablet; Take 1 tablet by mouth 2 times daily for 10 days  Dispense: 20 tablet; Refill: 0    2. Chronic RUQ pain  We will have patient reestablish with gastroenterology  - Amb External Referral To Gastroenterology    3. Elevated BP without diagnosis of hypertension  Patient will monitor blood pressure at home and follow-up  - CBC Auto Differential; Future  - Comprehensive Metabolic Panel; Future    4. Lipid screening  - Lipid, Fasting; Future      Return in about 2 months (around 7/11/2021) for AWV.     Stone Bullock MD

## 2021-05-21 ENCOUNTER — HOSPITAL ENCOUNTER (OUTPATIENT)
Dept: LAB | Age: 41
Discharge: HOME | End: 2021-05-21
Payer: MEDICARE

## 2021-05-21 DIAGNOSIS — Z13.220 LIPID SCREENING: ICD-10-CM

## 2021-05-21 DIAGNOSIS — R03.0 ELEVATED BP WITHOUT DIAGNOSIS OF HYPERTENSION: ICD-10-CM

## 2021-05-21 LAB
ALBUMIN SERPL-MCNC: 4.4 G/DL (ref 3.5–4.6)
ALP BLD-CCNC: 119 U/L (ref 35–104)
ALT SERPL-CCNC: 47 U/L (ref 0–41)
ANION GAP SERPL CALCULATED.3IONS-SCNC: 10 MEQ/L (ref 9–15)
AST SERPL-CCNC: 30 U/L (ref 0–40)
BASOPHILS ABSOLUTE: 0.1 K/UL (ref 0–0.2)
BASOPHILS RELATIVE PERCENT: 1 %
BILIRUB SERPL-MCNC: 0.9 MG/DL (ref 0.2–0.7)
BUN BLDV-MCNC: 10 MG/DL (ref 6–20)
CALCIUM SERPL-MCNC: 9.1 MG/DL (ref 8.5–9.9)
CHLORIDE BLD-SCNC: 104 MEQ/L (ref 95–107)
CHOLESTEROL, FASTING: 188 MG/DL (ref 0–199)
CO2: 25 MEQ/L (ref 20–31)
CREAT SERPL-MCNC: 0.72 MG/DL (ref 0.7–1.2)
EOSINOPHILS ABSOLUTE: 0.1 K/UL (ref 0–0.7)
EOSINOPHILS RELATIVE PERCENT: 1.6 %
GFR AFRICAN AMERICAN: >60
GFR NON-AFRICAN AMERICAN: >60
GLOBULIN: 2.4 G/DL (ref 2.3–3.5)
GLUCOSE BLD-MCNC: 98 MG/DL (ref 70–99)
HCT VFR BLD CALC: 44.8 % (ref 42–52)
HDLC SERPL-MCNC: 32 MG/DL (ref 40–59)
HEMOGLOBIN: 15.9 G/DL (ref 14–18)
LDL CHOLESTEROL CALCULATED: 124 MG/DL (ref 0–129)
LYMPHOCYTES ABSOLUTE: 1.8 K/UL (ref 1–4.8)
LYMPHOCYTES RELATIVE PERCENT: 32.3 %
MCH RBC QN AUTO: 36 PG (ref 27–31.3)
MCHC RBC AUTO-ENTMCNC: 35.4 % (ref 33–37)
MCV RBC AUTO: 101.9 FL (ref 80–100)
MONOCYTES ABSOLUTE: 0.3 K/UL (ref 0.2–0.8)
MONOCYTES RELATIVE PERCENT: 6.4 %
NEUTROPHILS ABSOLUTE: 3.2 K/UL (ref 1.4–6.5)
NEUTROPHILS RELATIVE PERCENT: 58.7 %
PDW BLD-RTO: 13.2 % (ref 11.5–14.5)
PLATELET # BLD: 283 K/UL (ref 130–400)
POTASSIUM SERPL-SCNC: 4.1 MEQ/L (ref 3.4–4.9)
RBC # BLD: 4.4 M/UL (ref 4.7–6.1)
SLIDE REVIEW: ABNORMAL
SODIUM BLD-SCNC: 139 MEQ/L (ref 135–144)
TOTAL PROTEIN: 6.8 G/DL (ref 6.3–8)
TRIGLYCERIDE, FASTING: 161 MG/DL (ref 0–150)
WBC # BLD: 5.4 K/UL (ref 4.8–10.8)

## 2021-05-21 PROCEDURE — 80053 COMPREHEN METABOLIC PANEL: CPT

## 2021-05-21 PROCEDURE — 85025 COMPLETE CBC W/AUTO DIFF WBC: CPT

## 2021-05-21 PROCEDURE — 80061 LIPID PANEL: CPT

## 2021-05-21 PROCEDURE — 36415 COLL VENOUS BLD VENIPUNCTURE: CPT

## 2021-05-25 DIAGNOSIS — R74.8 ELEVATED LIVER ENZYMES: Primary | ICD-10-CM

## 2021-07-01 RX ORDER — PAROXETINE HYDROCHLORIDE 40 MG/1
40 TABLET, FILM COATED ORAL EVERY MORNING
Qty: 90 TABLET | Refills: 0 | Status: SHIPPED | OUTPATIENT
Start: 2021-07-01 | End: 2021-12-28 | Stop reason: SDUPTHER

## 2021-07-01 NOTE — TELEPHONE ENCOUNTER
Requesting medication refill.  Please approve or deny this request.    Rx requested:  Requested Prescriptions     Pending Prescriptions Disp Refills    PARoxetine (PAXIL) 40 MG tablet 30 tablet      Sig: Take 1 tablet by mouth every morning       Last Office Visit:   5/11/2021    Last Filled:      Last Labs:      Next Visit Date:  Future Appointments   Date Time Provider Cristobal Siddiqi   7/12/2021  1:40 PM [address], MD Norm Mancilla 89    r

## 2021-07-06 ENCOUNTER — TELEPHONE (OUTPATIENT)
Dept: PRIMARY CARE CLINIC | Age: 41
End: 2021-07-06

## 2021-07-12 ENCOUNTER — OFFICE VISIT (OUTPATIENT)
Dept: FAMILY MEDICINE CLINIC | Age: 41
End: 2021-07-12
Payer: MEDICARE

## 2021-07-12 VITALS
OXYGEN SATURATION: 99 % | SYSTOLIC BLOOD PRESSURE: 130 MMHG | WEIGHT: 229 LBS | HEIGHT: 73 IN | TEMPERATURE: 97.5 F | DIASTOLIC BLOOD PRESSURE: 70 MMHG | BODY MASS INDEX: 30.35 KG/M2 | HEART RATE: 72 BPM

## 2021-07-12 DIAGNOSIS — D75.89 MACROCYTOSIS WITHOUT ANEMIA: ICD-10-CM

## 2021-07-12 DIAGNOSIS — G89.29 CHRONIC RUQ PAIN: ICD-10-CM

## 2021-07-12 DIAGNOSIS — R10.11 CHRONIC RUQ PAIN: ICD-10-CM

## 2021-07-12 DIAGNOSIS — Z87.448 HISTORY OF HEMATURIA: ICD-10-CM

## 2021-07-12 DIAGNOSIS — Z84.2 FAMILY HISTORY OF HEMATURIA: ICD-10-CM

## 2021-07-12 DIAGNOSIS — N28.1 RENAL CYST: Primary | ICD-10-CM

## 2021-07-12 DIAGNOSIS — Z12.5 PROSTATE CANCER SCREENING: ICD-10-CM

## 2021-07-12 PROCEDURE — 99214 OFFICE O/P EST MOD 30 MIN: CPT | Performed by: FAMILY MEDICINE

## 2021-07-16 ASSESSMENT — ENCOUNTER SYMPTOMS
CHEST TIGHTNESS: 0
APNEA: 0
VOMITING: 0
BLOOD IN STOOL: 0
CONSTIPATION: 0
NAUSEA: 0
ABDOMINAL PAIN: 0
SHORTNESS OF BREATH: 0
DIARRHEA: 0
COUGH: 0

## 2021-07-17 NOTE — PROGRESS NOTES
Subjective:      Patient ID: Blanca Branch is a 36 y.o. male who presents today for:     Chief Complaint   Patient presents with    Follow-up       HPI  Patient is a very pleasant 80-year-old male presents today to follow-up on chronic conditions. Patient states that he feels well and denies any current abdominal pain. He denies any dysuria, hematuria or discharge. Patient has seen urology in the past, but has not followed up with urology after last CT scan which was over 1 year ago. Past Medical History:   Diagnosis Date    Anxiety     Depression     Hypertension     Lumbar degenerative disc disease 9/21/2017    Lumbar radicular pain      Past Surgical History:   Procedure Laterality Date    BACK SURGERY N/A 2014    DENTAL SURGERY  2010    all    KNEE ARTHROSCOPY  1995    rt knee     History reviewed. No pertinent family history. Social History     Socioeconomic History    Marital status: Single     Spouse name: Not on file    Number of children: Not on file    Years of education: Not on file    Highest education level: Not on file   Occupational History    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   Tobacco Use    Smoking status: Current Every Day Smoker    Smokeless tobacco: Never Used    Tobacco comment: smokes marijuana every day   Substance and Sexual Activity    Alcohol use: Yes     Comment: 40-48 oz malt liquor/day    Drug use: Yes     Types: Marijuana     Comment: daily    Sexual activity: Not Currently     Partners: Female   Other Topics Concern    Not on file   Social History Narrative    Not on file     Social Determinants of Health     Financial Resource Strain: Low Risk     Difficulty of Paying Living Expenses: Not hard at all   Food Insecurity: No Food Insecurity    Worried About [address] in the Last Year: Never true    [address] in the Last Year: Never true   Transportation Needs: No Transportation Needs    Lack of Transportation (Medical):  No    Lack of Transportation (Non-Medical): No   Physical Activity:     Days of Exercise per Week:     Minutes of Exercise per Session:    Stress:     Feeling of Stress :    Social Connections:     Frequency of Communication with Friends and Family:     Frequency of Social Gatherings with Friends and Family:     Attends Yazidism Services:     Active Member of Clubs or Organizations:     Attends Club or Organization Meetings:     Marital Status:    Intimate Partner Violence:     Fear of Current or Ex-Partner:     Emotionally Abused:     Physically Abused:     Sexually Abused:      Current Outpatient Medications on File Prior to Visit   Medication Sig Dispense Refill    PARoxetine (PAXIL) 40 MG tablet Take 1 tablet by mouth every morning 90 tablet 0    atomoxetine (STRATTERA) 60 MG capsule Take 60 mg by mouth daily.  ibuprofen (ADVIL;MOTRIN) 600 MG tablet Take by mouth       No current facility-administered medications on file prior to visit. Allergies:  Patient has no known allergies. Review of Systems   Constitutional: Negative for activity change, appetite change and fatigue. Respiratory: Negative for apnea, cough, chest tightness and shortness of breath. Cardiovascular: Negative for chest pain, palpitations and leg swelling. Gastrointestinal: Negative for abdominal pain, blood in stool, constipation, diarrhea, nausea and vomiting. Musculoskeletal: Negative for arthralgias. Neurological: Negative for seizures and headaches. Psychiatric/Behavioral: Negative for hallucinations and suicidal ideas. Objective:   /70 (Site: Right Upper Arm, Position: Sitting, Cuff Size: Large Adult)   Pulse 72   Temp 97.5 °F (36.4 °C) (Temporal)   Ht 6' 1\" (1.854 m)   Wt 229 lb (103.9 kg)   SpO2 99%   BMI 30.21 kg/m²     Physical Exam  Vitals and nursing note reviewed. Constitutional:       General: He is not in acute distress. Appearance: Normal appearance. He is well-developed.  He is not diaphoretic. HENT:      Head: Normocephalic and atraumatic. Nose: Nose normal.      Mouth/Throat:      Mouth: Mucous membranes are moist.      Pharynx: Oropharynx is clear. Eyes:      Conjunctiva/sclera: Conjunctivae normal.      Pupils: Pupils are equal, round, and reactive to light. Cardiovascular:      Rate and Rhythm: Normal rate and regular rhythm. Heart sounds: Normal heart sounds. No murmur heard. No friction rub. No gallop. Pulmonary:      Effort: Pulmonary effort is normal. No respiratory distress. Breath sounds: Normal breath sounds. No wheezing or rales. Chest:      Chest wall: No tenderness. Abdominal:      General: Abdomen is flat. Bowel sounds are normal.      Palpations: Abdomen is soft. Tenderness: There is no abdominal tenderness. Musculoskeletal:      Cervical back: Normal range of motion. Skin:     General: Skin is warm and dry. Neurological:      Mental Status: He is alert and oriented to person, place, and time. Psychiatric:         Behavior: Behavior normal.         Thought Content: Thought content normal.         Judgment: Judgment normal.         Assessment & Plan:     1. Renal cyst  We will have patient reevaluated by urology  - Ambulatory referral to Urology    3. History of hematuria  - Ambulatory referral to Urology  - PSA Screening; Future  - Urine Reflex to Culture; Future    4. Chronic RUQ pain  Continue follow-up with gastroenterology    5. Prostate cancer screening  Shared decision making  - PSA Screening; Future    6. Macrocytosis without anemia  Check vitamin B12 and folate  -Folate levels & Folate; Future      Return in about 3 months (around 10/12/2021).     Jag Contreras MD

## 2021-07-19 RX ORDER — ATOMOXETINE 60 MG/1
60 CAPSULE ORAL DAILY
Qty: 30 CAPSULE | Refills: 0 | Status: SHIPPED | OUTPATIENT
Start: 2021-07-19 | End: 2022-01-20

## 2021-07-19 NOTE — TELEPHONE ENCOUNTER
Requesting medication refill. Please approve or deny this request.    Rx requested:  Requested Prescriptions     Pending Prescriptions Disp Refills    atomoxetine (STRATTERA) 60 MG capsule 30 capsule      Sig: Take 1 capsule by mouth daily       Last Office Visit:   7/12/2021    Last Filled:      Last Labs:      Next Visit Date:  No future appointments.

## 2021-07-21 ENCOUNTER — TELEPHONE (OUTPATIENT)
Dept: FAMILY MEDICINE CLINIC | Age: 41
End: 2021-07-21

## 2021-07-22 RX ORDER — ATOMOXETINE 40 MG/1
40 CAPSULE ORAL DAILY
Qty: 90 CAPSULE | Refills: 0 | Status: SHIPPED | OUTPATIENT
Start: 2021-07-22 | End: 2021-10-21 | Stop reason: SDUPTHER

## 2021-08-27 ENCOUNTER — HOSPITAL ENCOUNTER (OUTPATIENT)
Dept: LAB | Age: 41
Discharge: HOME | End: 2021-08-27
Payer: MEDICARE

## 2021-08-27 LAB
BILIRUBIN URINE: NEGATIVE
BLOOD, URINE: NEGATIVE
CLARITY: CLEAR
COLOR: YELLOW
FOLATE: 19.3 NG/ML (ref 7.3–26.1)
GLUCOSE URINE: NEGATIVE MG/DL
KETONES, URINE: NEGATIVE MG/DL
LEUKOCYTE ESTERASE, URINE: NEGATIVE
NITRITE, URINE: NEGATIVE
PH UA: 6 (ref 5–9)
PROTEIN UA: NEGATIVE MG/DL
SPECIFIC GRAVITY UA: 1.01 (ref 1–1.03)
URINE REFLEX TO CULTURE: NORMAL
UROBILINOGEN, URINE: 0.2 E.U./DL
VITAMIN B-12: 712 PG/ML (ref 232–1245)

## 2021-08-27 PROCEDURE — 81003 URINALYSIS AUTO W/O SCOPE: CPT

## 2021-08-27 PROCEDURE — 36415 COLL VENOUS BLD VENIPUNCTURE: CPT

## 2021-08-27 PROCEDURE — 82746 ASSAY OF FOLIC ACID SERUM: CPT

## 2021-08-27 PROCEDURE — 82607 VITAMIN B-12: CPT

## 2021-10-20 ENCOUNTER — PATIENT MESSAGE (OUTPATIENT)
Dept: FAMILY MEDICINE CLINIC | Age: 41
End: 2021-10-20

## 2021-10-21 RX ORDER — ATOMOXETINE 40 MG/1
40 CAPSULE ORAL DAILY
Qty: 90 CAPSULE | Refills: 0 | Status: SHIPPED | OUTPATIENT
Start: 2021-10-21 | End: 2022-01-20 | Stop reason: SDUPTHER

## 2021-12-28 RX ORDER — PAROXETINE HYDROCHLORIDE 40 MG/1
40 TABLET, FILM COATED ORAL EVERY MORNING
Qty: 90 TABLET | Refills: 0 | Status: SHIPPED | OUTPATIENT
Start: 2021-12-28 | End: 2022-06-22 | Stop reason: SDUPTHER

## 2021-12-28 NOTE — TELEPHONE ENCOUNTER
----- Message from Marybeth Kwan sent at 12/27/2021  4:27 PM EST -----  Subject: Refill Request    QUESTIONS  Name of Medication? PARoxetine (PAXIL) 40 MG tablet  Patient-reported dosage and instructions? Take 1 tablet by mouth every   morning  How many days do you have left? 0  Preferred Pharmacy? [facility]  Pharmacy phone number (if available)? [phone]  Additional Information for Provider? pt says the 40 mg tablet should   actually be 20 mg tablet  ---------------------------------------------------------------------------  --------------  CALL BACK INFO  What is the best way for the office to contact you? OK to leave message on   voicemail  Preferred Call Back Phone Number?  [phone]

## 2022-01-20 ENCOUNTER — PATIENT MESSAGE (OUTPATIENT)
Dept: FAMILY MEDICINE CLINIC | Age: 42
End: 2022-01-20

## 2022-01-20 RX ORDER — ATOMOXETINE 40 MG/1
40 CAPSULE ORAL DAILY
Qty: 30 CAPSULE | Refills: 0 | Status: SHIPPED | OUTPATIENT
Start: 2022-01-20 | End: 2022-02-18 | Stop reason: SDUPTHER

## 2022-01-20 NOTE — TELEPHONE ENCOUNTER
Patient is requesting medication refill.  Please approve or deny this request.  Has appt 02/04    Rx requested:  Requested Prescriptions     Pending Prescriptions Disp Refills    atomoxetine (STRATTERA) 40 MG capsule 30 capsule 0     Sig: Take 1 capsule by mouth daily         Last Office Visit:   7/12/2021      Next Visit Date:  Future Appointments   Date Time Provider Cristobal Siddiqi   2/4/2022  3:40 PM [address], MD Norm Mancilla 94

## 2022-01-20 NOTE — TELEPHONE ENCOUNTER
From: Herber Ramos  To: Dr. Felicitas Oliver: 1/20/2022 4:39 AM EST  Subject: Refill needed today ( Thursday)    Good morning,  Im contacting you to request a refill of my atomoxetine prescription. I took my last pill Wednesday evening so hopefully the refill can be called in and filled today so no doses are missed. Please and thank you. My apologies for waiting till the last minute. Duncan Cuellar

## 2022-02-04 ENCOUNTER — HOSPITAL ENCOUNTER (OUTPATIENT)
Dept: GENERAL RADIOLOGY | Age: 42
Discharge: HOME | End: 2022-02-06
Payer: MEDICARE

## 2022-02-04 ENCOUNTER — HOSPITAL ENCOUNTER (OUTPATIENT)
Age: 42
Discharge: HOME | End: 2022-02-06
Payer: MEDICARE

## 2022-02-04 ENCOUNTER — OFFICE VISIT (OUTPATIENT)
Dept: FAMILY MEDICINE CLINIC | Age: 42
End: 2022-02-04
Payer: MEDICARE

## 2022-02-04 VITALS
BODY MASS INDEX: 30.3 KG/M2 | OXYGEN SATURATION: 96 % | HEIGHT: 73 IN | HEART RATE: 80 BPM | WEIGHT: 228.6 LBS | TEMPERATURE: 97.3 F | SYSTOLIC BLOOD PRESSURE: 126 MMHG | DIASTOLIC BLOOD PRESSURE: 72 MMHG

## 2022-02-04 DIAGNOSIS — N28.1 RENAL CYST: ICD-10-CM

## 2022-02-04 DIAGNOSIS — R10.9 RIGHT FLANK PAIN: ICD-10-CM

## 2022-02-04 DIAGNOSIS — M25.561 ACUTE PAIN OF RIGHT KNEE: ICD-10-CM

## 2022-02-04 DIAGNOSIS — M79.604 RIGHT LEG PAIN: ICD-10-CM

## 2022-02-04 DIAGNOSIS — G89.29 CHRONIC BILATERAL LOW BACK PAIN WITH SCIATICA, SCIATICA LATERALITY UNSPECIFIED: ICD-10-CM

## 2022-02-04 DIAGNOSIS — N28.1 RENAL CYST, LEFT: ICD-10-CM

## 2022-02-04 DIAGNOSIS — M25.551 RIGHT HIP PAIN: ICD-10-CM

## 2022-02-04 DIAGNOSIS — M54.40 CHRONIC BILATERAL LOW BACK PAIN WITH SCIATICA, SCIATICA LATERALITY UNSPECIFIED: ICD-10-CM

## 2022-02-04 DIAGNOSIS — M25.561 ACUTE PAIN OF RIGHT KNEE: Primary | ICD-10-CM

## 2022-02-04 PROCEDURE — 81002 URINALYSIS NONAUTO W/O SCOPE: CPT | Performed by: FAMILY MEDICINE

## 2022-02-04 PROCEDURE — 72110 X-RAY EXAM L-2 SPINE 4/>VWS: CPT

## 2022-02-04 PROCEDURE — 73521 X-RAY EXAM HIPS BI 2 VIEWS: CPT

## 2022-02-04 PROCEDURE — 73564 X-RAY EXAM KNEE 4 OR MORE: CPT

## 2022-02-04 PROCEDURE — 73552 X-RAY EXAM OF FEMUR 2/>: CPT

## 2022-02-04 PROCEDURE — 99214 OFFICE O/P EST MOD 30 MIN: CPT | Performed by: FAMILY MEDICINE

## 2022-02-05 ASSESSMENT — ENCOUNTER SYMPTOMS
DIARRHEA: 0
WHEEZING: 0
ABDOMINAL PAIN: 0
NAUSEA: 0
CHEST TIGHTNESS: 0
SHORTNESS OF BREATH: 0
BACK PAIN: 1
BLOOD IN STOOL: 0
COUGH: 0
APNEA: 0
CONSTIPATION: 0
VOMITING: 0

## 2022-02-05 NOTE — PROGRESS NOTES
Subjective:      Patient ID: Patricia Lucia is a 39 y.o. male who presents today for:     Chief Complaint   Patient presents with    Medication Refill     patient is here for medication refills        HPI  Patient is a very pleasant 41-year-old male presents today to follow-up on chronic conditions. He states that mood is stable on Strattera and Paxil which he has been on for many years. .  Of note, patient fell approximately 3 months ago onto his right knee. Since that time he has had pain in his knee femur hip and lower back. He denies any swelling and has been able to walk but would have expected symptoms have resolved in this amount of time. He states the pain radiates towards his groin and along the anterior aspect of his thigh. He also is experiencing right-sided back/flank pain. He feels as though it may be his kidneys. But he denies any dysuria, hematuria or discharge. He is unable to leave a urine sample today. He would like to establish with a new urologist  Past Medical History:   Diagnosis Date    Anxiety     Depression     Hypertension     Lumbar degenerative disc disease 9/21/2017    Lumbar radicular pain      Past Surgical History:   Procedure Laterality Date    BACK SURGERY N/A 2014    DENTAL SURGERY  2010    all    KNEE ARTHROSCOPY  1995    rt knee     No family history on file.   Social History     Socioeconomic History    Marital status: Single     Spouse name: Not on file    Number of children: Not on file    Years of education: Not on file    Highest education level: Not on file   Occupational History    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   Tobacco Use    Smoking status: Current Every Day Smoker    Smokeless tobacco: Never Used    Tobacco comment: smokes marijuana every day   Substance and Sexual Activity    Alcohol use: Yes     Comment: 40-48 oz malt liquor/day    Drug use: Yes     Types: Marijuana Ronn Yolanda)     Comment: daily    Sexual activity: Not Currently     Partners: Female   Other Topics Concern    Not on file   Social History Narrative    Not on file     Social Determinants of Health     Financial Resource Strain: Low Risk     Difficulty of Paying Living Expenses: Not hard at all   Food Insecurity: No Food Insecurity    Worried About Running Out of Food in the Last Year: Never true    [address] in the Last Year: Never true   Transportation Needs: No Transportation Needs    Lack of Transportation (Medical): No    Lack of Transportation (Non-Medical): No   Physical Activity:     Days of Exercise per Week: Not on file    Minutes of Exercise per Session: Not on file   Stress:     Feeling of Stress : Not on file   Social Connections:     Frequency of Communication with Friends and Family: Not on file    Frequency of Social Gatherings with Friends and Family: Not on file    Attends Restorationism Services: Not on file    Active Member of [address] [place] [address] [employer] or Organizations: Not on file    Attends Club or Organization Meetings: Not on file    Marital Status: Not on file   Intimate Partner Violence:     Fear of Current or Ex-Partner: Not on file    Emotionally Abused: Not on file    Physically Abused: Not on file    Sexually Abused: Not on file   Housing Stability:     Unable to Pay for Housing in the Last Year: Not on file    Number of [place] in the Last Year: Not on file    Unstable Housing in the Last Year: Not on file     Current Outpatient Medications on File Prior to Visit   Medication Sig Dispense Refill    atomoxetine (STRATTERA) 40 MG capsule Take 1 capsule by mouth daily 30 capsule 0    PARoxetine (PAXIL) 40 MG tablet Take 1 tablet by mouth every morning 90 tablet 0     No current facility-administered medications on file prior to visit. Allergies:  Patient has no known allergies. Review of Systems   Constitutional: Positive for activity change. Negative for appetite change, fatigue and unexpected weight change.    Respiratory: Negative for apnea, cough, chest tightness, shortness of breath and wheezing. Cardiovascular: Negative for chest pain, palpitations and leg swelling. Gastrointestinal: Negative for abdominal pain, blood in stool, constipation, diarrhea, nausea and vomiting. Genitourinary: Positive for flank pain. Negative for decreased urine volume, difficulty urinating, dysuria, enuresis, frequency, genital sores, hematuria, penile discharge, penile pain, penile swelling, scrotal swelling, testicular pain and urgency. Musculoskeletal: Positive for arthralgias, back pain and gait problem. Neurological: Negative for seizures, weakness, numbness and headaches. Psychiatric/Behavioral: Negative for agitation, hallucinations and suicidal ideas. Objective:   /72 (Site: Right Upper Arm, Position: Sitting, Cuff Size: Large Adult)   Pulse 80   Temp 97.3 °F (36.3 °C) (Temporal)   Ht 6' 1\" (1.854 m)   Wt 228 lb 9.6 oz (103.7 kg)   SpO2 96%   BMI 30.16 kg/m²     Physical Exam  Vitals and nursing note reviewed. Constitutional:       General: He is not in acute distress. Appearance: Normal appearance. He is well-developed. He is not diaphoretic. HENT:      Head: Normocephalic and atraumatic. Nose: Nose normal.      Mouth/Throat:      Mouth: Mucous membranes are moist.      Pharynx: Oropharynx is clear. Eyes:      Conjunctiva/sclera: Conjunctivae normal.      Pupils: Pupils are equal, round, and reactive to light. Cardiovascular:      Rate and Rhythm: Normal rate and regular rhythm. Heart sounds: Normal heart sounds. No murmur heard. No friction rub. No gallop. Pulmonary:      Effort: Pulmonary effort is normal. No respiratory distress. Breath sounds: Normal breath sounds. No wheezing or rales. Chest:      Chest wall: No tenderness. Abdominal:      General: Abdomen is flat. Bowel sounds are normal.      Palpations: Abdomen is soft. Tenderness: There is no abdominal tenderness.    Musculoskeletal: Cervical back: Normal range of motion. Right knee: Bony tenderness present. No swelling, effusion, erythema, lacerations or crepitus. Decreased range of motion. Tenderness present over the medial joint line. No LCL laxity, MCL laxity or ACL laxity. Normal alignment and normal meniscus. Legs:    Skin:     General: Skin is warm and dry. Neurological:      Mental Status: He is alert and oriented to person, place, and time. Psychiatric:         Behavior: Behavior normal.         Thought Content: Thought content normal.         Judgment: Judgment normal.         Assessment & Plan:     1. Acute pain of right knee  We will obtain an x-ray of the knee to rule out fracture  Based on the mechanism of injury there may be a risk for meniscal injury    2. Right leg pain  We will obtain x-ray to rule out fracture  - XR FEMUR RIGHT (MIN 2 VIEWS); Future    3. Right hip pain  We will obtain x-ray to rule out fracture  - XR HIP BILATERAL W AP PELVIS (2 VIEWS); Future    4. Right flank pain  Unclear whether musculoskeletal origin or coming from the kidney. With patient's history of prior kidney cysts, and symptomatology radiating into the pelvis,  will obtain CT  - POCT Urinalysis no Micro  - CT ABDOMEN PELVIS WO CONTRAST Additional Contrast? Radiologist Recommendation; Future    5. Chronic bilateral low back pain with sciatica, sciatica laterality unspecified  - XR LUMBAR SPINE (MIN 4 VIEWS); Future    7. Renal cyst, left  Will refer to alternate urologist per patient request  - CT ABDOMEN PELVIS WO CONTRAST Additional Contrast? Radiologist Recommendation; Future  - Amb External Referral To Urology      Return in about 3 months (around 5/4/2022), or if symptoms worsen or fail to improve, for chronic condtions.     Saw Barrera MD

## 2022-02-14 ENCOUNTER — HOSPITAL ENCOUNTER (OUTPATIENT)
Dept: CT IMAGING | Age: 42
Discharge: HOME | End: 2022-02-16
Payer: MEDICARE

## 2022-02-14 DIAGNOSIS — R10.9 RIGHT FLANK PAIN: ICD-10-CM

## 2022-02-14 DIAGNOSIS — N28.1 RENAL CYST, LEFT: ICD-10-CM

## 2022-02-14 PROCEDURE — 74176 CT ABD & PELVIS W/O CONTRAST: CPT

## 2022-02-18 ENCOUNTER — PATIENT MESSAGE (OUTPATIENT)
Dept: FAMILY MEDICINE CLINIC | Age: 42
End: 2022-02-18

## 2022-02-18 RX ORDER — ATOMOXETINE 40 MG/1
40 CAPSULE ORAL DAILY
Qty: 30 CAPSULE | Refills: 0 | Status: SHIPPED | OUTPATIENT
Start: 2022-02-18 | End: 2022-03-22 | Stop reason: SDUPTHER

## 2022-02-18 NOTE — TELEPHONE ENCOUNTER
From: Myrna Barbosa  To: Dr. Dior Arch: 2/18/2022 8:39 AM EST  Subject: Refill needed    Good morning,  I did not discuss my medication or refills during my last visit. I have 1 day left on my Atomoxetine prescription,  Just looking for another months worth. I will be making an appointment with a mental health doctor Monday to assist me with further prescriptions.   Please and thank you     Keyshawn Johnson

## 2022-03-01 ENCOUNTER — OFFICE VISIT (OUTPATIENT)
Dept: ORTHOPEDIC SURGERY | Age: 42
End: 2022-03-01
Payer: MEDICARE

## 2022-03-01 VITALS
HEIGHT: 72 IN | WEIGHT: 228 LBS | HEART RATE: 87 BPM | OXYGEN SATURATION: 97 % | BODY MASS INDEX: 30.88 KG/M2 | TEMPERATURE: 97 F

## 2022-03-01 DIAGNOSIS — M54.16 LUMBAR RADICULOPATHY: Primary | ICD-10-CM

## 2022-03-01 DIAGNOSIS — M70.61 TROCHANTERIC BURSITIS OF RIGHT HIP: ICD-10-CM

## 2022-03-01 PROCEDURE — 99204 OFFICE O/P NEW MOD 45 MIN: CPT | Performed by: ORTHOPAEDIC SURGERY

## 2022-03-01 RX ORDER — MESALAMINE 0.38 G/1
0.75 CAPSULE, EXTENDED RELEASE ORAL DAILY
COMMUNITY
Start: 2021-08-24

## 2022-03-01 RX ORDER — METHYLPREDNISOLONE 4 MG/1
TABLET ORAL
Qty: 21 TABLET | Refills: 0 | Status: SHIPPED | OUTPATIENT
Start: 2022-03-01 | End: 2022-07-06

## 2022-03-02 ASSESSMENT — ENCOUNTER SYMPTOMS
EYE PAIN: 0
ABDOMINAL PAIN: 0
EYE ITCHING: 0
EYE DISCHARGE: 0
SHORTNESS OF BREATH: 0
CONSTIPATION: 0
DIARRHEA: 0
COUGH: 0

## 2022-03-02 NOTE — PROGRESS NOTES
This is a consult from Gwen Torres MD for evaluation of the patient's right hip pain. Patient ID:  Dory Evangelista is a 39 y.o. male who presents today for evaluation of right hip pain. Injury: no  Metal Allergy: no    Location of pain:  right lateral thigh, lateral hip, posterior thigh and buttock  Pain: yes; 7 on a scale of 1 to 10  Onset: gradual  Duration: 6 months  Frequency:  occurs daily  Quality: aching, boring and tingling   Swelling: none  Aggravating factors: weight bearing activity, standing and walking  Alleviating factors: removing weight from leg and rest  Mechanical symptoms: none  Radiation: yes Down the lateral thigh in the buttock posterior thigh    Activities: walking independently  Restriction:  decreased ambulatory tolerance  Progression:  worsening    Previous treatment:  none  NSAIDs:  none  PT:  none    Medications:    Current Outpatient Medications on File Prior to Visit   Medication Sig Dispense Refill    mesalamine (APRISO) 0.375 g extended release capsule Take 0.75 g by mouth daily      atomoxetine (STRATTERA) 40 MG capsule Take 1 capsule by mouth daily 30 capsule 0    PARoxetine (PAXIL) 40 MG tablet Take 1 tablet by mouth every morning 90 tablet 0     No current facility-administered medications on file prior to visit.        Allergies:    No Known Allergies    Past Medical History:    Past Medical History:   Diagnosis Date    Anxiety     Depression     Hypertension     Lumbar degenerative disc disease 9/21/2017    Lumbar radicular pain        Past Surgical History:    Past Surgical History:   Procedure Laterality Date    BACK SURGERY N/A 2014    DENTAL SURGERY  2010    all    KNEE ARTHROSCOPY  1995    rt knee       Social History:    Social History     Socioeconomic History    Marital status: Single     Spouse name: Not on file    Number of children: Not on file    Years of education: Not on file    Highest education level: Not on file   Occupational History    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   Tobacco Use    Smoking status: Former Smoker     Years: 5.00     Quit date:      Years since quittin.1    Smokeless tobacco: Never Used    Tobacco comment: smokes marijuana every day   Substance and Sexual Activity    Alcohol use: Yes     Comment: 40-48 oz malt liquor/day    Drug use: Yes     Types: Marijuana Ivonne Heys)     Comment: daily    Sexual activity: Not Currently     Partners: Female   Other Topics Concern    Not on file   Social History Narrative    Not on file     Social Determinants of Health     Financial Resource Strain: Low Risk     Difficulty of Paying Living Expenses: Not hard at all   Food Insecurity: No Food Insecurity    Worried About Running Out of Food in the Last Year: Never true    [place] of Food in the Last Year: Never true   Transportation Needs: No Transportation Needs    Lack of Transportation (Medical): No    Lack of Transportation (Non-Medical): No   Physical Activity:     Days of Exercise per Week: Not on file    Minutes of Exercise per Session: Not on file   Stress:     Feeling of Stress : Not on file   Social Connections:     Frequency of Communication with Friends and Family: Not on file    Frequency of Social Gatherings with Friends and Family: Not on file    Attends Mormonism Services: Not on file    Active Member of [address] [place] [address] [employer] or Organizations: Not on file    Attends Club or Organization Meetings: Not on file    Marital Status: Not on file   Intimate Partner Violence:     Fear of Current or Ex-Partner: Not on file    Emotionally Abused: Not on file    Physically Abused: Not on file    Sexually Abused: Not on file   Housing Stability:     Unable to Pay for Housing in the Last Year: Not on file    Number of [place] in the Last Year: Not on file    Unstable Housing in the Last Year: Not on file       Family History:    No family history on file.     Occupation:  Asked but not answered   - Occupational requirements: Unknown    Workers Compensation:  Have you missed work for this issue?  no  Is this issue being addressed under a worker's comp claim? no    Review of Systems:    Review of Systems   Constitutional: Negative for activity change, appetite change and chills. HENT: Negative for congestion, ear pain and hearing loss. Eyes: Negative for pain, discharge and itching. Respiratory: Negative for cough and shortness of breath. Cardiovascular: Negative for chest pain and leg swelling. Gastrointestinal: Negative for abdominal pain, constipation and diarrhea. Endocrine: Negative for cold intolerance, heat intolerance and polydipsia. Genitourinary: Negative for difficulty urinating, flank pain and frequency. Skin: Negative for rash and wound. Allergic/Immunologic: Negative for environmental allergies and food allergies. Neurological: Negative for dizziness, seizures and syncope. Physical Exam:    Examination of the right hip    Gait:  antalgic gait affecting right  Trendelenberg sign:  negative  Swelling:  none  Erythema:  none  Ecchymosis:  none  Extension:  5 degree flexion contracture  Flexion:  110  Internal rotation: 15 degrees  External rotation:  35 degrees  Abduction:  40 degrees  Adduction:  10 degrees  Strength:  abduction 5 and flexion 5  Palpation:  tenderness over right greater trochanter  Log roll:  non-painful  Straight leg raise:  Positive, reproducing radicular symptoms  Neurovascular status:  sensation intact, moves foot and ankle up and down, moves toes up and down and 2+ dorsalis pedis    Radiographs:  Radiographs of the right hip were personally reviewed, and they revealed:     No displaced pelvic fracture identified. No hip dislocation.  Joint spaces of both hips are maintained. No pubic diastases. Sacroiliac joints are symmetric and within normal limits.           Impression       No acute osseous abnormality. MRI: None    Diagnosis:     Diagnosis Orders   1.  Lumbar radiculopathy  Ambulatory referral to Physical Therapy   2. Trochanteric bursitis of right hip  Ambulatory referral to Physical Therapy       Plan:    The patient has some trochanteric bursitis of the right hip, but his main issue is a lumbar radiculopathy. We talked about the anatomy. Treatment options were discussed. Patient opted for a Medrol Dosepak and physical therapy. I explained to him that if he continued to have pain on the lateral side of the hip that he could come back for an injection into the bursa. Patient will go about his activity as tolerated and follow-up as needed. Orders Placed This Encounter   Procedures    Ambulatory referral to Physical Therapy     Referral Priority:   Routine     Referral Type:   Eval and Treat     Referral Reason:   Specialty Services Required     Number of Visits Requested:   1       Orders Placed This Encounter   Medications    methylPREDNISolone (MEDROL DOSEPACK) 4 MG tablet     Sig: On days 1-6 take as directed on package for first 6-day course. Dispense:  21 tablet     Refill:  0       Return if symptoms worsen or fail to improve.     Noel Boyce MD

## 2022-03-11 ENCOUNTER — HOSPITAL ENCOUNTER (OUTPATIENT)
Dept: PHYSICAL THERAPY | Age: 42
Setting detail: THERAPIES SERIES
Discharge: HOME | End: 2022-03-11
Payer: MEDICARE

## 2022-03-11 PROCEDURE — 97110 THERAPEUTIC EXERCISES: CPT

## 2022-03-11 PROCEDURE — 97162 PT EVAL MOD COMPLEX 30 MIN: CPT

## 2022-03-11 PROCEDURE — 97530 THERAPEUTIC ACTIVITIES: CPT

## 2022-03-11 ASSESSMENT — PAIN DESCRIPTION - PROGRESSION
CLINICAL_PROGRESSION: GRADUALLY WORSENING
CLINICAL_PROGRESSION_2: GRADUALLY WORSENING

## 2022-03-11 ASSESSMENT — PAIN DESCRIPTION - ONSET: ONSET: PROGRESSIVE

## 2022-03-11 ASSESSMENT — PAIN DESCRIPTION - LOCATION: LOCATION: BACK

## 2022-03-11 ASSESSMENT — PAIN DESCRIPTION - FREQUENCY: FREQUENCY: CONTINUOUS

## 2022-03-11 ASSESSMENT — PAIN SCALES - GENERAL: PAINLEVEL_OUTOF10: 4

## 2022-03-11 ASSESSMENT — PAIN DESCRIPTION - PAIN TYPE: TYPE: CHRONIC PAIN

## 2022-03-11 ASSESSMENT — PAIN DESCRIPTION - INTENSITY: RATING_2: 4

## 2022-03-11 ASSESSMENT — PAIN DESCRIPTION - ORIENTATION: ORIENTATION: RIGHT

## 2022-03-11 ASSESSMENT — PAIN DESCRIPTION - DURATION: DURATION_2: CONTINUOUS

## 2022-03-11 NOTE — PROGRESS NOTES
Physical Therapy  Initial Assessment  Date: 3/11/2022  Patient Name: Jacqueline Pham  MRN: [id number]  : 1980     Treatment Diagnosis: LBP with R radiuclar, R hip pbursitis with difficulty walking and pain     Restrictions  Position Activity Restriction  Other position/activity restrictions: assume back protection- limit bend and twist    Subjective   General  Additional Pertinent Hx: R knee arthroscopy , back surgery ; recently slippe on deck with [address] fall onto R knee and pain into R hip and R low back . Dr Rojas Santos sent to PT to see if will help. If doesn't help may do MRI and possible surgery per pt report. Referring Practitioner: Dr Gallo Sibley  Referral Date : 22  Diagnosis: R lumbar radiculopathy, bursitis of R hip  General Comment  Comments: Pt wearing AFO with hx of R knee surgery , and back surgery in , pt wears B AFO to dec ext rot  PT Visit Information  Onset Date: 12/10/21 (approx 3 months ago)  Total # of Visits Approved: 10  Total # of Visits to Date: 1  Plan of Care/Certification Expiration Date: 22  No Show: 0  Canceled Appointment: 0  Subjective  Subjective: The back and the R hip hurt about the same. Not sure which is worse  Pain Screening  Patient Currently in Pain: Yes  Pain Assessment  Pain Assessment: 0-10  Pain Level: 4  Patient's Stated Pain Goal: 1  Pain Type: Chronic pain  Pain Location: Back  Pain Orientation: Right  Pain Radiating Towards: thoracic to lumbar into buttocks and lateral R thigh to above knee; quad and medial thigh hurt all the time in additionto lateral thigh  Pain Descriptors: Aching; Sharp;Tingling;Tightness; Constant  Pain Frequency: Continuous  Pain Onset: Progressive  Clinical Progression: Gradually worsening  Non-Pharmaceutical Pain Intervention(s): Heat applied (everey night)  Multiple Pain Sites: Yes  Pain 2  Pain Rating 2: 4  Patient's Stated Pain Goal 2: 1  Pain Descriptors 2: Aching;Tingling;Pins and Needles; Sharp;Sore;Constant  Pain Duration 2: Continuous  Clinical Progression 2: Gradually worsening  Non-Pharmaceutical Pain Intervention(s) 2: Heat applied  Vital Signs  Patient Currently in Pain: Yes  Patient Observation  Observations: arrives using std cane R and B AFO    Vision/Hearing   monovision, legally blind    Social/Functional History  Social/Functional History  Lives With: Other (comment) (Mom , 50# dog)  Type of Home: House  Home Layout: Two level; Laundry in basement  Home Access:  (1 + 1)  Bathroom Shower/Tub: Walk-in shower  Bathroom Toilet: Standard  ADL Assistance: Independent  Active : No (optic nerve injury , legally blind- monovision)  Leisure & Hobbies: dog for a walk  Additional Comments: uses B AFO all the time for foot alignment and better gait; 1/2 inch shoe lift on right    Objective     Observation/Palpation  Observation: B AFO , R AFO with 1/2 inch shoe lift, level pelvis at ASIS and illiac crest with braces in place; RLE inc ext rot in stance    PROM RLE (degrees)  RLE General PROM: hasmtring 90/90 - 40 deg  PROM LLE (degrees)  LLE General PROM: hamstring 90/90 -29 deg  Spine  Lumbar: flex 75% tight, ext 10 deg iwth inc pain, LSB and RSB 50% with inc pain to R and dec pain /stretch to L , LROT and RROT 75%    Strength RLE  Strength RLE: Exception  R Hip Flexion: 4/5  R Hip Extension: 4-/5  R Hip ABduction: 4/5  R Hip ADduction: 4/5  R Knee Flexion: 4-/5  R Knee Extension: 4+/5  R Ankle Dorsiflexion: 4/5  R Ankle Plantar flexion: 4+/5  Strength LLE  Strength LLE: Exception  L Hip Flexion: 4+/5  L Hip Extension: 4/5  L Hip ABduction: 4/5  L Hip ADduction: 4/5  L Knee Flexion: 4/5  L Knee Extension: 4+/5  L Ankle Dorsiflexion: 4/5  L Ankle Plantar Flexion: 4+/5     Additional Measures  Flexibility: hamstring 90/90 L -28deg, R - 40 deg  Special Tests: Oswestry LBP scale 31/50= 62% disability; long leg distraction negative R and L, compression positive on R with inc hip and LBP  Other: valgus angle LLE 10 deg, right 15 deg, B hips with adducted angle of 10 deg medial- not normal straight out body; Sensation  Overall Sensation Status:  (tingling and temp changes in RLE per pt report)     Ambulation  Ambulation?: Yes  More Ambulation?: Yes  Ambulation 1  Surface: carpet  Device: Single point cane (R hand )  Assistance: Independent  Quality of Gait: BLE with inc ext rot and in AFOS, fair pace, good balance, lurching gait over RLE with each step, antalgic and causing inc pressure on R hip and Rlower lumbar, inc valgus angle and addictioin tndency in both hips. Distance: 100ft   Ambulation 2  Surface - 2: carpet  Device 2: Single point cane (changed cane to L hand to unweight right LE)  Assistance 2: Independent  Quality of Gait 2: less antlagic giat, ess additional overpressure on RLE with each stance phase; educated why using cane in LUE helps unweight RLE, pt relates understanding      Exercises  Exercise 1: standing flexion with 5 deep breaths in nose and out mouth  Exercise 2: hooklying pevic tilts 3 hold x 10 reps  Exercise 3: bridging 2 hold and 2 lower x 10 reps  Exercise 4: hooklying B hip abd ext rot resisted with red tband  Exercise 19: MHP x 10 minutes with cover and one towel layer this date for pain relief R back andR hip  Exercise 20: KT tape 25 % tension I strip distal to prox over R hip bursa for pain relief and support \"star\" over R hip for inc circulation . Assessment   Conditions Requiring Skilled Therapeutic Intervention  Body structures, Functions, Activity limitations: Decreased functional mobility ; Decreased ROM; Decreased strength;Decreased endurance; Increased pain;Decreased posture  Assessment: 41yom who currently uses std cane and B AFO ( R AFO with 1/2 inch lift built in) to assist with gait ; pt with inc valgus anle at knees and adducted at B hips in neutral resulting In R hip bursa more fwd / ant than normal . Pt using cane in [id number] hand, able to improve gait and dec pain in R hip and bakc with change in cane use to Lhad. Pt responded well to back protection educatin. Pt issued red TB, back protection handout and HEP handout this date. Pt to come 1-2 x week for 4- 5 weeks or max 10 vistis to dec pain in R low back and RLE , including R hip bursa. Pt with KT tape applied to R hip bursa for eddie relief and MHP , no charge, 10 minutes post session. Pt reprots back at 2/110 and hip 0/10 post eval and tx today. Treatment Diagnosis: LBP with R radiuclar, R hip pbursitis with difficulty walking and pain          Plan   Plan  Times per week: 1-2x week  Plan weeks: 4-5 weeks or 10 visits  Current Treatment Recommendations: Strengthening,ROM,Functional Mobility Training,Gait Training,Modalities,Manual Therapy - Soft Tissue Mobilization,Home Exercise Program,Pain Management,Positioning    Goals  Short term goals  Time Frame for Short term goals: 1-2 weeks  Short term goal 1: Pt reporting any decrease in R LBP and RLE radicular symptoms  Short term goal 2: Pt reporting any decrease in R hip pain   Short term goal 3: Pt reporting at least onece 5/7 days to increase strength in back and LE and dec pain c/o  Short term goal 4: Inc PROM B hamstring 90/90 >= 5 deg  to decrease low back pressure  Short term goal 5: Pt walking 440ft in <= 3 minutes with cane in LUE to unweight RLE and dec pain , less antalgic gait overall. Long term goals  Time Frame for Long term goals : 4-5 weeks  Long term goal 1: Pt wtih LBP at 1/10 50% of day per pt report  Long term goal 2: Pt reporting hip and radicular pain reduced by 50% per pt repot  Long term goal 3: amb >= 800ft in 6 minute walk test with cane in L hand and pain post <= 2/10  Long term goal 4: Oswestry <= 30% disabiltiy per pt report  Long term goal 5: Pt competent advanced HEP for low back and R hip  Patient Goals   Patient goals : \"I want my back and hip to be less painful so easier to get around and walk the dog ( 50#). Therapy Time   Individual Concurrent Group Co-treatment   Time In  1200         Time Out  115         Minutes  65 ev/tx   10 min P no charge                 Dotty, [id number]

## 2022-03-18 ENCOUNTER — HOSPITAL ENCOUNTER (OUTPATIENT)
Dept: PHYSICAL THERAPY | Age: 42
Setting detail: THERAPIES SERIES
Discharge: HOME | End: 2022-03-18
Payer: MEDICARE

## 2022-03-18 PROCEDURE — 97110 THERAPEUTIC EXERCISES: CPT

## 2022-03-18 PROCEDURE — 97116 GAIT TRAINING THERAPY: CPT

## 2022-03-18 PROCEDURE — 97140 MANUAL THERAPY 1/> REGIONS: CPT

## 2022-03-18 ASSESSMENT — PAIN SCALES - GENERAL: PAINLEVEL_OUTOF10: 4

## 2022-03-18 NOTE — PROGRESS NOTES
Physical Therapy  Daily Treatment Note  Date: 3/18/2022  Patient Name: Amber Miles  MRN: [id number]     :   1980    Treatment Diagnosis: LBP with R radiuclar, R hip pbursitis with difficulty walking and pain      Restrictions  Position Activity Restriction  Other position/activity restrictions: assume back protection- limit bend and twist    Subjective:   General  Additional Pertinent Hx: R knee arthroscopy , back surgery ; recently slippe on deck with [address] fall onto R knee and pain into R hip and R low back . Dr Juan Cope sent to PT to see if will help. If doesn't help may do MRI and possible surgery per pt report. Referring Practitioner: Dr Kylie La  PT Visit Information  Onset Date: 12/10/21 (approx 3 months ago)  Total # of Visits Approved: 10  Total # of Visits to Date: 2  Plan of Care/Certification Expiration Date: 22  No Show: 0  Canceled Appointment: 0  Subjective  Subjective: Pt. states he feels cane is better in R hip but back pain not better. L hand = L knee pain and mild twitches R hip bursitis not as bad. Pt. still with back pain   Pain Screening  Patient Currently in Pain: Yes  Pain Assessment  Pain Assessment: 0-10  Pain Level: 4  Vital Signs  Patient Currently in Pain: Yes       Treatment Activities:   Manual therapy  Joint mobilization: R sacral mobs to dec tightness  Soft Tissue Mobalization: MFR to R QL and lumbar paraspinals, R glute and IT band many triggor points throughout  Other: KT tape to R IT band to dec tightness/pain 30% tension, horizontal strip max tension R IT and distal HS to dec tightness, X over R greater trochanter 50% tension for pain releif. Ambulation  Ambulation?: Yes  Ambulation 1  Surface: carpet  Device: Single point cane (LUE )  Assistance: Independent  Quality of Gait: BLE with inc ext rot R> L focus on more neutral as able cane in LUE to unweight R RLE IR with inc distance more noticible.     Distance: 100ft Exercises  Exercise 2: hooklying pevic tilts 3 hold x 10 reps focus on inhaleing and exhaling. Exercise 3: bridging 2 hold and 2 lower x 10 reps  Exercise 4: hooklying B hip abd ext rot resisted with red tband x  10 hold 3 -5 sec   Exercise 5: SKTC x 20-30 sec alternating x 3 sec   Exercise 6: Supine HS stretch 90/90 30 sec x 3      Modalities  Cryotherapy (Minutes\Location): Pt. declined educated use of CP PRN for pain reduction. Manual therapy  Joint mobilization: R sacral mobs to dec tightness  Soft Tissue Mobalization: MFR to R QL and lumbar paraspinals, R glute and IT band many triggor points throughout  Other: KT tape to R IT band to dec tightness/pain 30% tension, horizontal strip max tension R IT and distal HS to dec tightness, X over R greater trochanter 50% tension for pain releif. Assessment:   Conditions Requiring Skilled Therapeutic Intervention  Body structures, Functions, Activity limitations: Decreased functional mobility ; Decreased ROM; Decreased strength;Decreased endurance; Increased pain;Decreased posture  Assessment: Pt. now using cane in L UE except when walking dog due to weakness and dog may pull. Better in hip less pain overall. Back pain remain unchanged. Practiced amb with less ER as able using cane in LUE. Added HS and SKTC to dec back pain and inc flexibility. Good recall of HEP thus far. Educated importance of compliance. KT tape for dec pain RLE. Pain post holds 4/10. Pt. declined CP. Treatment Diagnosis: LBP with R radiuclar, R hip pbursitis with difficulty walking and pain         Goals:  Short term goals  Time Frame for Short term goals: 1-2 weeks  Short term goal 1: Pt reporting any decrease in R LBP and RLE radicular symptoms  Short term goal 2: Pt reporting any decrease in R hip pain   Short term goal 3: Pt reporting at least onece 5/7 days to increase strength in back and LE and dec pain c/o  Short term goal 4:  Inc PROM B hamstring 90/90 >= 5 deg  to decrease low back pressure  Short term goal 5: Pt walking 440ft in <= 3 minutes with cane in LUE to unweight RLE and dec pain , less antalgic gait overall. Long term goals  Time Frame for Long term goals : 4-5 weeks  Long term goal 1: Pt wtih LBP at 1/10 50% of day per pt report  Long term goal 2: Pt reporting hip and radicular pain reduced by 50% per pt repot  Long term goal 3: amb >= 800ft in 6 minute walk test with cane in L hand and pain post <= 2/10  Long term goal 4: Oswestry <= 30% disabiltiy per pt report  Long term goal 5: Pt competent advanced HEP for low back and R hip  Patient Goals   Patient goals : \"I want my back and hip to be less painful so easier to get around and walk the dog ( 50#).     Plan:          Plan  Times per week: 1-2x week  Plan weeks: 4-5 weeks or 10 visits  Current Treatment Recommendations: Strengthening,ROM,Functional Mobility Training,Gait Training,Modalities,Manual Therapy - Soft Tissue Mobilization,Home Exercise Program,Pain Management,Positioning    Therapy Time   Individual Concurrent Group Co-treatment   Time In  1100         Time Out  [address]ol[address], PTA  License and [address] Number: [id number]

## 2022-03-21 ENCOUNTER — PATIENT MESSAGE (OUTPATIENT)
Dept: FAMILY MEDICINE CLINIC | Age: 42
End: 2022-03-21

## 2022-03-22 RX ORDER — ATOMOXETINE 40 MG/1
40 CAPSULE ORAL DAILY
Qty: 30 CAPSULE | Refills: 0 | Status: SHIPPED | OUTPATIENT
Start: 2022-03-22 | End: 2022-04-25 | Stop reason: SDUPTHER

## 2022-03-22 NOTE — TELEPHONE ENCOUNTER
Comments: pt is out of medication     Last Office Visit (last PCP visit):   2/4/2022    Next Visit Date:  Future Appointments   Date Time Provider Cristobal Siddiqi   3/25/2022  8:02 PM Ally Flores [address] [place]   4/1/2022  0:50 PM Iain Brink, [facility] [address] [place]       **If hasn't been seen in over a year OR hasn't followed up according to last diabetes/ADHD visit, make appointment for patient before sending refill to provider.     Rx requested:  Requested Prescriptions     Pending Prescriptions Disp Refills    atomoxetine (STRATTERA) 40 MG capsule 30 capsule 0     Sig: Take 1 capsule by mouth daily

## 2022-03-23 NOTE — TELEPHONE ENCOUNTER
From: Mery Trinh  To: Dr. Carie Watkins: 3/21/2022 12:15 PM EDT  Subject: 1 more refill needed    Good afternoon,  I sent a message on Friday but received no response. I have been able to make my mental health doctors appointments. Unfortunately I am out of my atomoxatine and still have some time before my appointment. Is it possible to receive one more months prescription?   Please and thank you     Karoline Kim

## 2022-03-25 ENCOUNTER — HOSPITAL ENCOUNTER (OUTPATIENT)
Dept: PHYSICAL THERAPY | Age: 42
Setting detail: THERAPIES SERIES
Discharge: HOME | End: 2022-03-25
Payer: MEDICARE

## 2022-03-25 PROCEDURE — 97110 THERAPEUTIC EXERCISES: CPT

## 2022-03-25 PROCEDURE — 97140 MANUAL THERAPY 1/> REGIONS: CPT

## 2022-03-25 PROCEDURE — 97116 GAIT TRAINING THERAPY: CPT

## 2022-03-25 ASSESSMENT — PAIN DESCRIPTION - ORIENTATION: ORIENTATION: RIGHT

## 2022-03-25 ASSESSMENT — PAIN DESCRIPTION - DESCRIPTORS: DESCRIPTORS: ACHING;TIGHTNESS

## 2022-03-25 ASSESSMENT — PAIN SCALES - GENERAL: PAINLEVEL_OUTOF10: 4

## 2022-03-25 ASSESSMENT — PAIN DESCRIPTION - LOCATION: LOCATION: BACK

## 2022-03-25 ASSESSMENT — PAIN DESCRIPTION - PAIN TYPE: TYPE: CHRONIC PAIN

## 2022-03-25 NOTE — PROGRESS NOTES
Physical Therapy  Daily Treatment Note  Date: 3/25/2022  Patient Name: Irvin Wild  MRN: [id number]     :   1980    Treatment Diagnosis: LBP with R radiuclar, R hip pbursitis with difficulty walking and pain      Restrictions  Position Activity Restriction  Other position/activity restrictions: assume back protection- limit bend and twist    Subjective:   General  Additional Pertinent Hx: R knee arthroscopy , back surgery ; recently slippe on deck with [address] fall onto R knee and pain into R hip and R low back . Dr Shamar Myers sent to PT to see if will help. If doesn't help may do MRI and possible surgery per pt report. Referring Practitioner: Dr Tana Whitney  PT Visit Information  Onset Date: 12/10/21 (approx 3 months ago)  Total # of Visits Approved: 10  Total # of Visits to Date: 3  Plan of Care/Certification Expiration Date: 22  No Show: 0  Canceled Appointment: 0  Subjective  Subjective: Pt. states he has been having to take smaller steps when he walks. Pt .states if he does he gets more R hip pain. Pt. states he is unable to clench his R glute with bridging. Icing helps but feels inc pain and radiates sometimes to ribcage. Pt. feels he is still getting alot of medial HS pain and pain into hip flexor and groin. Pain Screening  Patient Currently in Pain: Yes  Pain Assessment  Pain Assessment: 0-10  Pain Level: 4  Pain Type: Chronic pain  Pain Location: Back  Pain Orientation: Right  Pain Radiating Towards: thoracic to lumber. Pain Descriptors: Aching;Tightness  Vital Signs  Patient Currently in Pain: Yes       Treatment Activities:   Manual therapy  Joint mobilization: R sacral mobs to dec tightness, PA mobs T8-T 12 grade I-III to improve mobility  Soft Tissue Mobalization: MFR to mid and low back to dec tightness and pain.     Other: KT tape to R IT band to dec tightness/pain 30% tension, horizontal strip max tension R IT and distal HS to dec tightness, X over R greater trochanter 50% tension for pain releif. KT tape to back with horizontal strip across B SI 50% tension to dec pain. two I strips along paraspinals to T8 30% tension to dec tightness. Ambulation  Ambulation?: Yes  Ambulation 1  Surface: carpet  Device: Single point cane (LUE )  Assistance: Independent  Quality of Gait: RLE ER less than previous session. Inc WB cane and LLE due to weakness and pain R   Distance: 80'                   Exercises  Exercise 2: hooklying pevic tilts 3 hold x 10 reps focus on inhaleing and exhaling. Exercise 3: bridging 2 hold and 2 lower x 10 reps  Exercise 4: hooklying B hip abd ext rot resisted with red tband x  10 hold 3 -5 sec   Exercise 7: seated hamstring stretch 30 sec x 3 VC for form and tall sit. Exercise 8: back stretch lateral and forward seated on plinth with hands on blue PB holding 30 sec x 3 ea direction. Exercise 9: SLR B  with ABD bracing x 5 inc pain R hip flexor and groin  Exercise 10: B sidelying clamshells x 10   Exercise 11: B sidelying hip abd x 5 VC and tactile cues for form   Exercise 12: Prone alt hip ext very difficulty and inc pain thus deffered post 2 reps ea alternating   Exercise 13: Prone HS curl x 5 B greater difficulty R than L. Manual therapy  Joint mobilization: R sacral mobs to dec tightness, PA mobs T8-T 12 grade I-III to improve mobility  Soft Tissue Mobalization: MFR to mid and low back to dec tightness and pain. Other: KT tape to R IT band to dec tightness/pain 30% tension, horizontal strip max tension R IT and distal HS to dec tightness, X over R greater trochanter 50% tension for pain releif. KT tape to back with horizontal strip across B SI 50% tension to dec pain. two I strips along paraspinals to T8 30% tension to dec tightness. Assessment:   Conditions Requiring Skilled Therapeutic Intervention  Body structures, Functions, Activity limitations: Decreased functional mobility ; Decreased ROM; Decreased strength;Decreased endurance; Increased pain;Decreased posture  Assessment: Pt. tolerates inc strengthening with no inc in pain. Difficulty performing trevin on R side. VC and tactile cues needed for new therex. Pt. with no inc in pain. Added stretches for back using ball at home. Pt. has ball at home. Pain post is 4-5/10. Treatment Diagnosis: LBP with R radiuclar, R hip pbursitis with difficulty walking and pain         Goals:  Short term goals  Time Frame for Short term goals: 1-2 weeks  Short term goal 1: Pt reporting any decrease in R LBP and RLE radicular symptoms  Short term goal 2: Pt reporting any decrease in R hip pain   Short term goal 3: Pt reporting at least onece 5/7 days to increase strength in back and LE and dec pain c/o  Short term goal 4: Inc PROM B hamstring 90/90 >= 5 deg  to decrease low back pressure  Short term goal 5: Pt walking 440ft in <= 3 minutes with cane in LUE to unweight RLE and dec pain , less antalgic gait overall. Long term goals  Time Frame for Long term goals : 4-5 weeks  Long term goal 1: Pt wtih LBP at 1/10 50% of day per pt report  Long term goal 2: Pt reporting hip and radicular pain reduced by 50% per pt repot  Long term goal 3: amb >= 800ft in 6 minute walk test with cane in L hand and pain post <= 2/10  Long term goal 4: Oswestry <= 30% disabiltiy per pt report  Long term goal 5: Pt competent advanced HEP for low back and R hip  Patient Goals   Patient goals : \"I want my back and hip to be less painful so easier to get around and walk the dog ( 50#).     Plan:      Plan  Times per week: 1-2x week  Plan weeks: 4-5 weeks or 10 visits  Current Treatment Recommendations: Strengthening,ROM,Functional Mobility Training,Gait Training,Modalities,Manual Therapy - Soft Tissue Mobilization,Home Exercise Program,Pain Management,Positioning        Therapy Time   Individual Concurrent Group Co-treatment   Time In  100         Time Out  200         Minutes  Esa Nathan Anastasiya Mckeon, PTA  License and [address] Number: [id number]

## 2022-04-01 ENCOUNTER — HOSPITAL ENCOUNTER (OUTPATIENT)
Dept: PHYSICAL THERAPY | Age: 42
Setting detail: THERAPIES SERIES
Discharge: HOME | End: 2022-04-01
Payer: MEDICARE

## 2022-04-01 PROCEDURE — 97110 THERAPEUTIC EXERCISES: CPT

## 2022-04-01 PROCEDURE — 97140 MANUAL THERAPY 1/> REGIONS: CPT

## 2022-04-01 ASSESSMENT — PAIN SCALES - GENERAL: PAINLEVEL_OUTOF10: 4

## 2022-04-01 ASSESSMENT — PAIN DESCRIPTION - ORIENTATION: ORIENTATION: RIGHT

## 2022-04-01 ASSESSMENT — PAIN DESCRIPTION - PAIN TYPE: TYPE: CHRONIC PAIN

## 2022-04-01 ASSESSMENT — PAIN DESCRIPTION - LOCATION: LOCATION: BACK

## 2022-04-01 ASSESSMENT — PAIN DESCRIPTION - FREQUENCY: FREQUENCY: CONTINUOUS

## 2022-04-01 ASSESSMENT — PAIN DESCRIPTION - DESCRIPTORS: DESCRIPTORS: ACHING;TIGHTNESS

## 2022-04-01 NOTE — PROGRESS NOTES
Physical Therapy  Daily Treatment Note  Date: 2022  Patient Name: Jt Otero  MRN: [id number]     :   1980    Treatment Diagnosis: LBP with R radiuclar, R hip pbursitis with difficulty walking and pain      Restrictions  Position Activity Restriction  Other position/activity restrictions: assume back protection- limit bend and twist    Subjective:   General  Additional Pertinent Hx: R knee arthroscopy , back surgery ; recently slippe on deck with [address] fall onto R knee and pain into R hip and R low back . Dr Capo Carty sent to PT to see if will help. If doesn't help may do MRI and possible surgery per pt report. Referring Practitioner: Dr Sp Tierney  PT Visit Information  Onset Date: 12/10/21 (approx 3 months ago)  Total # of Visits Approved: 10  Total # of Visits to Date: 4  Plan of Care/Certification Expiration Date: 22  No Show: 0  Canceled Appointment: 0  Subjective  Subjective: Pt. states he had to do all the dog walking his mom had a medical issue. Pt. states some pain into Lats and buttock. Inc pain post last session for Saturday and . General Comment  Comments: Pt. demos antalgic limp coming into therapy. Pain Screening  Patient Currently in Pain: Yes  Pain Assessment  Pain Assessment: 0-10  Pain Level: 4  Pain Type: Chronic pain  Pain Location: Back  Pain Orientation: Right  Pain Radiating Towards: R lats to R lateral hip and thigh. Pain Descriptors: Aching;Tightness  Pain Frequency: Continuous  Vital Signs  Patient Currently in Pain: Yes       Treatment Activities:   Manual therapy  Joint mobilization: R sacral mobs to dec tightness   PROM: L sidelying R sidebody stretch with R arm overhead and opposition by therapist at pelvis and mid back.    Passive Lat stretch 20 sec x 3   Manual traction: Attempted hooklying manual traction no stretch felt better releif with LLD L anf R with mild oscillations for pain reduction    Soft Tissue Mobalization: MFR to R Proximal lats and orgin inc tightness noted in Lats, QL, and mid and lower back to dec tightness   Other: KT tape to R IT band to dec tightness/pain 30% tension, horizontal strip max tension R IT and distal HS to dec tightness,   KT tape to back with horizontal strip across B SI 50% tension to dec pain. two I strips along paraspinals to T8 30% tension to dec tightness. B GH to T8 with post and inf pull 30% tension for postural awareness and pain releif. Exercises  Exercise 1: seated lat stretch R only 20-30 sec   Exercise 2: Supine piriformis stretch 30 sec x 3   Exercise 3: Supine IT band x 3 hold 30 sec   Exercise 4: Supine HS stretch 30 sec x 3   Exercise 5: PPT x 10 hold 3-5 sec   Exercise 6: Bridge x 10 hold 5 sec   Exercise 7: Hooklying hIp Abd RTB x 15 hold 3-5 sec         Manual therapy  Joint mobilization: R sacral mobs to dec tightness   PROM: L sidelying R sidebody stretch with R arm overhead and opposition by therapist at pelvis and mid back. Passive Lat stretch 20 sec x 3   Manual traction: Attempted hooklying manual traction no stretch felt better releif with LLD L anf R with mild oscillations for pain reduction    Soft Tissue Mobalization: MFR to R Proximal lats and orgin inc tightness noted in Lats, QL, and mid and lower back to dec tightness   Other: KT tape to R IT band to dec tightness/pain 30% tension, horizontal strip max tension R IT and distal HS to dec tightness,   KT tape to back with horizontal strip across B SI 50% tension to dec pain. two I strips along paraspinals to T8 30% tension to dec tightness. B GH to T8 with post and inf pull 30% tension for postural awareness and pain releif. Assessment:   Conditions Requiring Skilled Therapeutic Intervention  Body structures, Functions, Activity limitations: Decreased functional mobility ; Decreased ROM; Decreased strength;Decreased endurance; Increased pain;Decreased posture  Assessment: Pt. reports inc pain post last session x 2 days following inc strengthening therex. Pt. also walking dog more this week and c/o inc R sided pain into lats and low back and R hip. Focused on stretches and educated lat stretch to reduce tightness. DIscussed with pt. how his posture is affecting pain. Edcauted to try and improve posture throughout day. Also applied KT tape for postural awareness and reporting some releif with KT donned with previous applications. Pt. rates pain 2/10 post.  Time spent with manual therapy to reduce tightness. Treatment Diagnosis: LBP with R radiuclar, R hip pbursitis with difficulty walking and pain         Goals:  Short term goals  Time Frame for Short term goals: 1-2 weeks  Short term goal 1: Pt reporting any decrease in R LBP and RLE radicular symptoms  Short term goal 2: Pt reporting any decrease in R hip pain  (minimal )  Short term goal 3: Pt reporting at least onece 5/7 days to increase strength in back and LE and dec pain c/o  Short term goal 4: Inc PROM B hamstring 90/90 >= 5 deg  to decrease low back pressure  Short term goal 5: Pt walking 440ft in <= 3 minutes with cane in LUE to unweight RLE and dec pain , less antalgic gait overall. Long term goals  Time Frame for Long term goals : 4-5 weeks  Long term goal 1: Pt wtih LBP at 1/10 50% of day per pt report  Long term goal 2: Pt reporting hip and radicular pain reduced by 50% per pt repot  Long term goal 3: amb >= 800ft in 6 minute walk test with cane in L hand and pain post <= 2/10  Long term goal 4: Oswestry <= 30% disabiltiy per pt report  Long term goal 5: Pt competent advanced HEP for low back and R hip  Patient Goals   Patient goals : \"I want my back and hip to be less painful so easier to get around and walk the dog ( 50#).     Plan:      Plan  Times per week: 1-2x week  Plan weeks: 4-5 weeks or 10 visits  Current Treatment Recommendations: Strengthening,ROM,Functional Mobility Training,Gait Training,Modalities,Manual Therapy - Soft Tissue Mobilization,Home Exercise Program,Pain Management,Positioning        Therapy Time   Individual Concurrent Group Co-treatment   Time In  100         Time Out  200         Minutes  [address] [facility]  License and [place] [address] Number: [id number]

## 2022-04-08 ENCOUNTER — HOSPITAL ENCOUNTER (OUTPATIENT)
Dept: PHYSICAL THERAPY | Age: 42
Setting detail: THERAPIES SERIES
Discharge: HOME | End: 2022-04-08
Payer: MEDICARE

## 2022-04-08 PROCEDURE — 97530 THERAPEUTIC ACTIVITIES: CPT

## 2022-04-08 PROCEDURE — 97140 MANUAL THERAPY 1/> REGIONS: CPT

## 2022-04-08 PROCEDURE — 97110 THERAPEUTIC EXERCISES: CPT

## 2022-04-08 PROCEDURE — 97116 GAIT TRAINING THERAPY: CPT

## 2022-04-08 ASSESSMENT — PAIN DESCRIPTION - ORIENTATION: ORIENTATION: RIGHT

## 2022-04-08 ASSESSMENT — PAIN DESCRIPTION - DESCRIPTORS: DESCRIPTORS: ACHING;TIGHTNESS

## 2022-04-08 ASSESSMENT — PAIN DESCRIPTION - PROGRESSION: CLINICAL_PROGRESSION: NOT CHANGED

## 2022-04-08 ASSESSMENT — PAIN SCALES - GENERAL: PAINLEVEL_OUTOF10: 4

## 2022-04-08 ASSESSMENT — PAIN DESCRIPTION - DURATION: DURATION_2: CONTINUOUS

## 2022-04-08 ASSESSMENT — PAIN DESCRIPTION - FREQUENCY: FREQUENCY: CONTINUOUS

## 2022-04-08 ASSESSMENT — PAIN DESCRIPTION - LOCATION
LOCATION_2: HIP
LOCATION: BACK

## 2022-04-08 ASSESSMENT — PAIN DESCRIPTION - INTENSITY: RATING_2: 3

## 2022-04-08 ASSESSMENT — PAIN DESCRIPTION - PAIN TYPE
TYPE: CHRONIC PAIN
TYPE_2: ACUTE PAIN;CHRONIC PAIN

## 2022-04-08 ASSESSMENT — PAIN DESCRIPTION - ONSET: ONSET: PROGRESSIVE

## 2022-04-08 NOTE — PROGRESS NOTES
Physical Therapy  Recheck Treatment Note  Date: 2022  Patient Name: Sherian Osgood  MRN: [id number]     :   1980    Subjective:   General  Additional Pertinent Hx: R knee arthroscopy , back surgery ; recently slippe on deck with [address] fall onto R knee and pain into R hip and R low back . Dr Jorge Luis Hawkins sent to PT to see if will help. If doesn't help may do MRI and possible surgery per pt report. Referring Practitioner: Dr Tanya Aguilar  PT Visit Information  Onset Date: 12/10/21  Total # of Visits Approved: 10  Total # of Visits to Date: 5  Plan of Care/Certification Expiration Date: 22  No Show: 0  Canceled Appointment: 0  Subjective  Subjective: Pt reports daily HEP but only one set of 10. Using cane LUE  in community to [place] R side. Pt doesn't feel much change in low back and RLE pain since start of PT, Pt would like ot see what an MRI says. Pain Screening  Patient Currently in Pain: Yes  Pain Assessment  Pain Assessment: 0-10  Pain Level: 4  Patient's Stated Pain Goal: 1  Pain Type: Chronic pain  Pain Location: Back  Pain Orientation: Right  Pain Radiating Towards: R low back to buttocks and out to lat and post thigh, some pain on inner thigh also  Pain Descriptors: Aching;Tightness (R quad lumoborum tight rebounds quickly after manual release)  Pain Frequency: Continuous  Pain Onset: Progressive  Clinical Progression: Not changed  Multiple Pain Sites: Yes  Pain 2  Pain Rating 2: 3  Patient's Stated Pain Goal 2: 1  Pain Type 2: Acute Pain;Chronic Pain  Pain Location 2: Hip  Pain Orientation 2: Outer;Posterior;Mid  Pain Radiating Towards 2: hip to thigh lat post and medial,s ometimes to toes with numbness and tinging  Pain Descriptors 2: Aching;Tingling;Burning;Numbness  Pain Duration 2: Continuous  Non-Pharmaceutical Pain Intervention(s) 2:   (heat doesn't always help anymore)  Vital Signs  Patient Currently in Pain: Yes  Patient Observation  Observations: cand in L hand upon arrival R hand in poscket flexed fwd psoture iwth dec lordosi, josieuchsamantha in chair while completing Oswestry LBP scale= 28/50= 56% disabiltiy       Treatment Activities:   Manual therapy  Other: KT tape at 25-50% distal to prox R IT band for relief of pain and support \"star\" over R hip bursa as pt felt this helped wit the hip radiuclar pain - max 5 days. Ambulation  Ambulation?: Yes  More Ambulation?: Yes  Ambulation 1  Surface: carpet;level tile  Device: Single point cane (LUE to unweight RLE)  Assistance: Independent  Quality of Gait: better pace and better urpigth posture with cane in LUE, tnednecy to put R hand in pocet- educated Newt Douse for counter balance, pian in creased at 3.5 minutes at approx 600ft in back and kneeR . Distance: 960 ft  Comments: 4/8/22:  6 minute walk test- inc pain R knee old injury and low back           PROM RLE (degrees)  RLE General PROM: hamstring 90/90 - 36 deg  PROM LLE (degrees)  LLE General PROM: hamstring 90/90 -39 deg  Spine  Lumbar: flex 75% tight, ext 10 deg with inc pain, LSB and RSB 50% with inc pain to R knee , LROT and RROT 75% no change in pain.    Special Tests: educated proper lifting technique to use LE and se t pelvis with knees slighlty bent- dificult with R knee pain from old knee surgery in 1995  Strength RLE  Strength RLE: Exception  Comment: sitting  R Hip Flexion: 4/5  R Hip Extension: 4-/5  R Hip ABduction: 4/5  R Hip ADduction: 4/5  R Knee Flexion: 4-/5  R Knee Extension: 4+/5  R Ankle Dorsiflexion: 4/5  R Ankle Plantar flexion: 4+/5  Strength LLE  Strength LLE: Exception  Comment: sitting  L Hip Flexion: 4+/5  L Hip Extension: 4/5  L Hip ABduction: 4/5  L Hip ADduction: 4/5  L Knee Flexion: 4/5  L Knee Extension: 4+/5  L Ankle Dorsiflexion: 4/5  L Ankle Plantar Flexion: 4+/5    Exercises  Exercise 5: PPT x 10 hold 3-5 sec   Exercise 14: seated hamstring  stretch 30 sec x 2   Exercise 20: educated continue HPRE while on PT hold awaiting MD appt Manual therapy  Other: KT tape at 25-50% distal to prox R IT band for relief of pain and support \"star\" over R hip bursa as pt felt this helped wit the hip radicular pain - max 5 days. Assessment:   Conditions Requiring Skilled Therapeutic Intervention  Body structures, Functions, Activity limitations: Decreased functional mobility ; Decreased ROM; Decreased strength;Decreased endurance; Increased pain;Decreased posture  Assessment: Pt with little to no change in LBP and R hip radicular pain, complicated by old R knee pain c/o and need to wear B AFO for LE stabiltiy with walking. Pt needing cuing for posutre, longer holds with stretches and form. Pt does not feel any significant change wtih PT and reports doing HEP x 10 reps daily. Due to limited progress, recommend hold PT one month while pt has follwoup with MD to determine if further testing or pain management needed for more success in pain relief and increased abilty to participate in PT and daily life. Pain appears majority for back to hip and R knee to R hip . Pt agreeable to plan to hold one month and follow up with Dr Capo Carty. Treatment Diagnosis: LBP with R radiuclar, R hip pbursitis with difficulty walking and pain   REQUIRES PT FOLLOW UP:  (TBD 4/8/22)  Activity Tolerance  Activity Tolerance: Patient limited by pain      G-Code:     OutComes Score                                                     Goals:  Short term goals  Time Frame for Short term goals: 1-2 weeks  Short term goal 1: Pt reporting any decrease in R LBP and RLE radicular symptoms (4/8/22 not met 4/10 majority of time)  Short term goal 2: Pt reporting any decrease in R hip pain  (met 4/8/22 down to 3/10)  Short term goal 3: Pt reporting at least onece 5/7 days to increase strength in back and LE and dec pain c/o (met with some inc pain in LE with Hep 4/8/22)  Short term goal 4:  Inc PROM B hamstring 90/90 >= 5 deg  to decrease low back pressure (not met 4/8/22- pt reports some inc pain in knee R )  Short term goal 5: Pt walking 440ft in <= 3 minutes with cane in LUE to unweight RLE and dec pain , less antalgic gait overall.  (met 4/8/22 less antalgic gait 2 eymd=847 ft )  Long term goals  Time Frame for Long term goals : 4-5 weeks  Long term goal 1: Pt with LBP at 1/10 50% of day per pt report (not met, pain increasing or same 4/8/22)  Long term goal 2: Pt reporting hip and radicular pain reduced by 50% per pt repot (P met occasionL reduction in L hip, \"not much\")  Long term goal 3: amb >= 800ft in 6 minute walk test with cane in L hand and pain post <= 2/10 (P met 4/8/22 met distance,but did not meet pain as inc 5/10)  Long term goal 4: Oswestry <= 30% disabiltiy per pt report (4/8/22 dec from 62% eval to 56% at recheck P Met )  Long term goal 5: Pt competent advanced HEP for low back and R hip (4/8/22 in progress , pain persisting 4/8/22)  Patient Goals   Patient goals : \"I want my back and hip to be less painful so easier to get around and walk the dog ( 50#).  (Not met 4/8/22)    Plan:             Therapy Time   Individual Concurrent Group Co-treatment   Time In  1200         Time Out  100         Minutes  [address] [place] [address], [id number]

## 2022-04-26 NOTE — PROGRESS NOTES
Physical Therapy  Pt telephoned Op PT dept and left note regarding need to extend PT hold as unable to get in to see ortho MD for appt until 5/3/22. Pt will call and schedule more PT if warranted by MD appt. Renan Ahmadi, PT [facility].

## 2022-05-03 ENCOUNTER — OFFICE VISIT (OUTPATIENT)
Dept: ORTHOPEDIC SURGERY | Age: 42
End: 2022-05-03
Payer: MEDICARE

## 2022-05-03 VITALS
BODY MASS INDEX: 29.12 KG/M2 | HEIGHT: 72 IN | TEMPERATURE: 97 F | HEART RATE: 66 BPM | OXYGEN SATURATION: 98 % | WEIGHT: 215 LBS

## 2022-05-03 DIAGNOSIS — M54.16 LUMBAR RADICULOPATHY: Primary | ICD-10-CM

## 2022-05-03 DIAGNOSIS — M17.11 PRIMARY OSTEOARTHRITIS OF RIGHT KNEE: ICD-10-CM

## 2022-05-03 PROCEDURE — 99214 OFFICE O/P EST MOD 30 MIN: CPT | Performed by: ORTHOPAEDIC SURGERY

## 2022-05-03 RX ORDER — LIDOCAINE HYDROCHLORIDE 10 MG/ML
8 INJECTION, SOLUTION INFILTRATION; PERINEURAL ONCE
Status: SHIPPED | OUTPATIENT
Start: 2022-05-03

## 2022-05-03 RX ORDER — TRIAMCINOLONE ACETONIDE 40 MG/ML
80 INJECTION, SUSPENSION INTRA-ARTICULAR; INTRAMUSCULAR ONCE
Status: DISCONTINUED | OUTPATIENT
Start: 2022-05-03 | End: 2022-08-09

## 2022-05-03 NOTE — PROGRESS NOTES
Patient ID:  Sherian Osgood is a 39 y.o. male who presents today for evaluation of right hip pain. The patient actually has right lower back and buttock pain which radiates down the posterior aspect of the thigh and around the lateral thigh towards the anterior aspect of the knee down the anterior shin. He received a Medrol Dosepak and went to physical therapy. He reports absolutely no improvement in his symptoms. Injury: no    Location of pain:  right lower back and buttock pain which radiates down the posterior aspect of the thigh and around the lateral thigh towards the anterior aspect of the knee down the anterior shin  Pain: yes; 7 on a scale of 1 to 10  Onset: gradual  Duration: 2 years  Frequency:  occurs daily  Quality: aching and boring   Swelling: patient does not note any swelling of the joint  Aggravating factors: weight bearing activity, standing and walking  Alleviating factors: removing weight from leg and rest  Mechanical symptoms: none  Radiation: yes lower back and buttock pain which radiates down the posterior aspect of the thigh and around the lateral thigh towards the anterior aspect of the knee down the anterior shin    Activities: walking independently  Restriction:  decreased ambulatory tolerance  Progression:  worsening    Previous treatment:  physical therapy  NSAIDs:  none  PT:  Physical therapy, completed    Medications:    Current Outpatient Medications on File Prior to Visit   Medication Sig Dispense Refill    B Complex-C (SUPER B COMPLEX PO)       atomoxetine (STRATTERA) 40 MG capsule Take 1 capsule by mouth daily 30 capsule 2    mesalamine (APRISO) 0.375 g extended release capsule Take 0.75 g by mouth daily      PARoxetine (PAXIL) 40 MG tablet Take 1 tablet by mouth every morning 90 tablet 0    methylPREDNISolone (MEDROL DOSEPACK) 4 MG tablet On days 1-6 take as directed on package for first 6-day course.  21 tablet 0     No current facility-administered medications on file prior to visit. Allergies:    No Known Allergies    Physical Exam:    Examination of the right hip    Gait:  antalgic gait affecting right  Trendelenberg sign:  negative  Swelling:  none  Erythema:  none  Ecchymosis:  none  Extension:  5 degree flexion contracture  Flexion:  100  Internal rotation: 10 degrees  External rotation:  30 degrees  Abduction:  40 degrees  Adduction:  10 degrees  Strength:  abduction 5 and flexion 5  Palpation:  tenderness over right greater trochanter  Log roll:  non-painful  Straight leg raise:  Positive, reproducing radicular symptoms  Neurovascular status:  sensation intact, moves foot and ankle up and down, moves toes up and down and 2+ dorsalis pedis    Radiographs:  None today but the hip x-rays look good and he has some lumbar disc degeneration on prior films. MRI: None    Diagnosis:     Diagnosis Orders   1. Lumbar radiculopathy  MRI LUMBAR SPINE WO CONTRAST    Ambulatory referral to Pain Medicine   2. Primary osteoarthritis of right knee  AK ARTHROCENTESIS ASPIR&/INJ MAJOR JT/BURSA W/O US       Plan:    Patient has a lumbar radiculopathy which failed to improve with physical therapy. We discussed the pertinent anatomy. Treatment options were discussed. We are going to get the patient in for an MRI of the lumbar spine. We are going to have him follow-up with one of our pain management colleagues for a thorough spine evaluation. Follow-up as needed if he has any new issues may arise. Orders Placed This Encounter   Procedures    MRI LUMBAR SPINE WO CONTRAST     Standing Status:   Future     Standing Expiration Date:   5/3/2023     Order Specific Question:   Reason for exam:     Answer:   persistent leg pain with completion of therapy - PT no help     Order Specific Question:   What is the sedation requirement?      Answer:   None    Ambulatory referral to Pain Medicine     Referral Priority:   Routine     Referral Type:   Eval and Treat     Referral Reason: Specialty Services Required     Referred to Provider:   José Miguel Pedraza DO     Requested Specialty:   Physical Medicine and Rehab     Number of Visits Requested:   1    NM ARTHROCENTESIS ASPIR&/INJ MAJOR JT/BURSA W/O US       Orders Placed This Encounter   Medications    lidocaine 1 % injection 8 mL    triamcinolone acetonide (KENALOG-40) injection 80 mg       No follow-ups on file.     Shirley Enciso MD

## 2022-06-17 ENCOUNTER — HOSPITAL ENCOUNTER (OUTPATIENT)
Dept: MRI IMAGING | Age: 42
Discharge: HOME | End: 2022-06-19
Payer: MEDICARE

## 2022-06-17 ENCOUNTER — PATIENT MESSAGE (OUTPATIENT)
Dept: FAMILY MEDICINE CLINIC | Age: 42
End: 2022-06-17

## 2022-06-17 DIAGNOSIS — M54.16 LUMBAR RADICULOPATHY: ICD-10-CM

## 2022-06-17 PROCEDURE — 72148 MRI LUMBAR SPINE W/O DYE: CPT

## 2022-06-20 NOTE — TELEPHONE ENCOUNTER
From: Juan Francisco Rivera  To: Dr. Ra Nichols: 6/17/2022 7:28 PM EDT  Subject: Lyndon Ardon,  Mom has had been really busy and has some new health issues that require her immediate attention. She will also be out of town for 3 weeks in July and August.  So needless to say, I still have not visited my mental health doctor yet. In order to be prepared for her absence, Im looking for a 90 day refill on my 40mg Paxil and an additional 2 refills added to my 1 remaining Atomoxatine refill. I have enough of all medications currently. Thank you again.   Ollie Ndiaye

## 2022-06-20 NOTE — TELEPHONE ENCOUNTER
Comments:     Last Office Visit (last PCP visit):   2/4/2022    Next Visit Date:  No future appointments. **If hasn't been seen in over a year OR hasn't followed up according to last diabetes/ADHD visit, make appointment for patient before sending refill to provider.     Rx requested:  Requested Prescriptions     Pending Prescriptions Disp Refills    PARoxetine (PAXIL) 40 MG tablet 90 tablet 0     Sig: Take 1 tablet by mouth every morning    atomoxetine (STRATTERA) 40 MG capsule 30 capsule 2     Sig: Take 1 capsule by mouth daily

## 2022-06-22 RX ORDER — ATOMOXETINE 40 MG/1
40 CAPSULE ORAL DAILY
Qty: 30 CAPSULE | Refills: 2 | Status: SHIPPED | OUTPATIENT
Start: 2022-06-22 | End: 2022-09-23 | Stop reason: SDUPTHER

## 2022-06-22 RX ORDER — PAROXETINE HYDROCHLORIDE 40 MG/1
40 TABLET, FILM COATED ORAL EVERY MORNING
Qty: 90 TABLET | Refills: 0 | Status: SHIPPED | OUTPATIENT
Start: 2022-06-22

## 2022-07-06 ENCOUNTER — OFFICE VISIT (OUTPATIENT)
Dept: ORTHOPEDIC SURGERY | Age: 42
End: 2022-07-06
Payer: MEDICARE

## 2022-07-06 VITALS
OXYGEN SATURATION: 98 % | HEART RATE: 67 BPM | WEIGHT: 215 LBS | TEMPERATURE: 97.4 F | BODY MASS INDEX: 29.12 KG/M2 | HEIGHT: 72 IN

## 2022-07-06 DIAGNOSIS — M54.16 LUMBAR RADICULOPATHY: Primary | ICD-10-CM

## 2022-07-06 PROCEDURE — 99214 OFFICE O/P EST MOD 30 MIN: CPT | Performed by: ORTHOPAEDIC SURGERY

## 2022-07-07 NOTE — PROGRESS NOTES
Patient ID:  Ines Felty is a 39 y.o. male who presents today for evaluation of lumbar radiculopathy. The patient actually has right lower back and buttock pain which radiates down the posterior aspect of the thigh and around the lateral thigh towards the anterior aspect of the knee down the anterior shin. He received a Medrol Dosepak and went to physical therapy. He reports absolutely no improvement in his symptoms.     Injury: no    Location of pain:  right Lower back and buttock pain which radiates down the posterior aspect of the thigh and around the lateral thigh towards the anterior aspect of the knee down the anterior shin  Pain: yes; 7 on a scale of 1 to 10  Onset: gradual  Duration: 1 years  Frequency:  occurs daily  Quality: aching and boring   Swelling: patient does not note any swelling of the joint  Aggravating factors: weight bearing activity, standing and walking  Alleviating factors: removing weight from leg and rest  Mechanical symptoms: none  Radiation: yes As above    Activities: walking independently  Restriction:  decreased ambulatory tolerance  Progression:  worsening    Previous treatment:  Patient had a Medrol Dosepak and underwent physical therapy without improvement  NSAIDs:  none  PT:  Physical therapy, completed    Medications:    Current Outpatient Medications on File Prior to Visit   Medication Sig Dispense Refill    PARoxetine (PAXIL) 40 MG tablet Take 1 tablet by mouth every morning 90 tablet 0    atomoxetine (STRATTERA) 40 MG capsule Take 1 capsule by mouth daily 30 capsule 2    B Complex-C (SUPER B COMPLEX PO)       mesalamine (APRISO) 0.375 g extended release capsule Take 0.75 g by mouth daily       Current Facility-Administered Medications on File Prior to Visit   Medication Dose Route Frequency Provider Last Rate Last Admin    lidocaine 1 % injection 8 mL  8 mL Intra-artICUlar Once Vania Hebert MD        triamcinolone acetonide [facility]) injection 80 mg  80 mg Intra-artICUlar Once Demarcus Pascal MD           Allergies:    No Known Allergies    Physical Exam:    Examination of the right hip    Gait:  antalgic gait affecting right  Trendelenberg sign:  negative  Swelling:  none  Erythema:  none  Ecchymosis:  none  Extension:  slight flexion contracture  Flexion:  110  Internal rotation: 15 degrees  External rotation:  35 degrees  Abduction:  40 degrees  Adduction:  10 degrees  Strength:  abduction 5 and flexion 5  Palpation:  No tenderness  Log roll:  non-painful  Straight leg raise:  Positive, reproducing radicular symptoms  Neurovascular status:  sensation intact, moves foot and ankle up and down, moves toes up and down and 2+ dorsalis pedis    Radiographs:  None      MRI:    Findings:       The conus medullaris ends normally. The alignment of the lumbar spine is anatomic.        The vertebral body heights are well maintained. There is no aggressive bone marrow signal abnormality.       Disc desiccation at L3-L4, L4-5, L5-S1, levels where there is mild degenerative facet loss. Mild degenerative changes at L3-L4 through L5-S1.       L1-L2: No significant disc bulge, spinal canal or neuroforaminal stenosis.        L2-L3: No significant disc bulge, spinal canal or neuroforaminal stenosis.        L3-L4: Small disc bulge with posterior endplate spurring. Mild facet arthropathy. Mild spinal canal stenosis. Mild bilateral neuroforaminal stenosis.       L4-L5: Small disc bulge with superimposed central disc protrusion resulting in severe spinal canal stenosis. Mild bilateral neuroforaminal stenosis. Postsurgical changes of posterior decompression.       L5-S1: Moderate central disc protrusion abuts the bilateral S1 nerves spinal canal. No neuroforaminal or spinal canal stenosis. Postsurgical changes of posterior decompression.       Visualized paravertebral soft tissues are grossly unremarkable.  A 1.5 cm hyperintense T2 structure of the left kidney is most likely a cyst.         Impression       Degenerative changes of the lumbar spine as detailed.             Diagnosis:     Diagnosis Orders   1. Lumbar radiculopathy  Ana Cristina Alfred HandlerLynn DO, [facility]       Plan:    The patient has a lumbar radiculopathy which is failed to improve with therapy. MRI shows that he has some neuroforaminal encroachment as well as significant osteoarthritic changes of the lumbar spine. We going to make arrangements for the patient to meet with one of our pain management colleagues for consideration of possible nerve root injections. We discussed pertinent anatomy. All of his questions were answered. He will follow-up with me as needed. Orders Placed This Encounter   Procedures   3 [facility] KevinDO antonio, Pain Management, [place]     Referral Priority:   Routine     Referral Type:   Eval and Treat     Referral Reason:   Specialty Services Required     Referred to Provider:   Sven Tomas DO     Requested Specialty:   Pain Medicine     Number of Visits Requested:   1       No orders of the defined types were placed in this encounter. Return if symptoms worsen or fail to improve.     Jose Armando Avalos MD

## 2022-08-09 ENCOUNTER — INITIAL CONSULT (OUTPATIENT)
Dept: PAIN MANAGEMENT | Age: 42
End: 2022-08-09
Payer: MEDICARE

## 2022-08-09 VITALS
DIASTOLIC BLOOD PRESSURE: 82 MMHG | TEMPERATURE: 97.1 F | OXYGEN SATURATION: 98 % | WEIGHT: 213 LBS | SYSTOLIC BLOOD PRESSURE: 128 MMHG | HEART RATE: 72 BPM | HEIGHT: 72 IN | BODY MASS INDEX: 28.85 KG/M2

## 2022-08-09 DIAGNOSIS — M96.1 LUMBAR POST-LAMINECTOMY SYNDROME: Primary | ICD-10-CM

## 2022-08-09 DIAGNOSIS — M47.817 LUMBOSACRAL SPONDYLOSIS WITHOUT MYELOPATHY: ICD-10-CM

## 2022-08-09 PROCEDURE — 99203 OFFICE O/P NEW LOW 30 MIN: CPT | Performed by: PAIN MEDICINE

## 2022-08-09 ASSESSMENT — ENCOUNTER SYMPTOMS
GASTROINTESTINAL NEGATIVE: 1
EYES NEGATIVE: 1
RESPIRATORY NEGATIVE: 1
ALLERGIC/IMMUNOLOGIC NEGATIVE: 1

## 2022-08-09 NOTE — PROGRESS NOTES
History of Present Illness     Patient Identification  Jose Albright is a 39 y.o. male. Patient information was obtained from patient. Chief Complaint   Chief Complaint   Patient presents with    Back Pain     Right side pain only     Leg Pain       Patient presents with complaint of back pain. This is a result of falling on black ICE. Onset of pain was 2014  ago and has been gradually worsening since 1 year after another fall. The pain is located in right lower back, described as sharp and pins and neeles and rated as moderate and severe, with radiation posterior Rt thigh . Symptoms include no other symptoms. The patient denies weakness, numbness, incontinence. The patient denies other injuries. Care prior to arrival consisted of PT 4 months ago with minimal relief. MRI 6/7/22:  L3-L4: Small disc bulge with posterior endplate spurring. Mild facet arthropathy. Mild spinal canal stenosis. Mild bilateral neuroforaminal stenosis. L4-L5: Small disc bulge with superimposed central disc protrusion resulting in severe spinal canal stenosis. Mild bilateral neuroforaminal stenosis. Postsurgical changes of posterior decompression. L5-S1: Moderate central disc protrusion abuts the bilateral S1 nerves spinal canal. No neuroforaminal or spinal canal stenosis. Postsurgical changes of posterior decompression. Past Medical History:   Diagnosis Date    Anxiety     Depression     Hypertension     Lumbar degenerative disc disease 9/21/2017    Lumbar radicular pain      History reviewed. No pertinent family history.   Current Outpatient Medications   Medication Sig Dispense Refill    PARoxetine (PAXIL) 40 MG tablet Take 1 tablet by mouth every morning 90 tablet 0    atomoxetine (STRATTERA) 40 MG capsule Take 1 capsule by mouth daily 30 capsule 2    B Complex-C (SUPER B COMPLEX PO)       mesalamine (APRISO) 0.375 g extended release capsule Take 0.75 g by mouth daily       Current Facility-Administered Medications   Medication Dose Route Frequency Provider Last Rate Last Admin    lidocaine 1 % injection 8 mL  8 mL Intra-artICUlar Once Ashley Day MD         No Known Allergies    Review of Systems  Review of Systems   Constitutional: Negative. HENT: Negative. Eyes: Negative. Respiratory: Negative. Cardiovascular: Negative. Gastrointestinal: Negative. Endocrine: Negative. Genitourinary: Negative. Musculoskeletal: Negative. Skin: Negative. Allergic/Immunologic: Negative. Neurological: Negative. Hematological: Negative. Psychiatric/Behavioral: Negative. Anxiety and depression. Bipolar   All other systems reviewed and are negative. Physical Exam     /82   Pulse 72   Temp 97.1 °F (36.2 °C)   Ht 6' (1.829 m)   Wt 213 lb (96.6 kg)   SpO2 98%   BMI 28.89 kg/m²   Physical Exam  Vitals and nursing note reviewed. Chaperone present: Mother. Constitutional:       Appearance: Normal appearance. HENT:      Head: Normocephalic. Right Ear: Ear canal normal.      Left Ear: Ear canal normal.      Nose: Nose normal.      Mouth/Throat:      Mouth: Mucous membranes are moist.   Eyes:      Extraocular Movements: Extraocular movements intact. Conjunctiva/sclera: Conjunctivae normal.      Pupils: Pupils are equal, round, and reactive to light. Cardiovascular:      Rate and Rhythm: Normal rate and regular rhythm. Pulses: Normal pulses. Heart sounds: Normal heart sounds. Pulmonary:      Effort: Pulmonary effort is normal.      Breath sounds: Normal breath sounds. Abdominal:      General: Abdomen is flat. Bowel sounds are normal.      Palpations: Abdomen is soft. Musculoskeletal:         General: Normal range of motion. Cervical back: Normal range of motion and neck supple. Comments: Has braces both amkles from prior meniscus repairs to prevent external rotations.   Good gait able to walk on Toes and Heels, Good rom Flexion extension of lumbar area painful, SLRs not painfull , Tender facets both sides , No SI Joint pain, Gainslins not painful. Skin:     General: Skin is warm. Capillary Refill: Capillary refill takes less than 2 seconds. Neurological:      General: No focal deficit present. Mental Status: He is alert. Mental status is at baseline. He is disoriented. Psychiatric:         Mood and Affect: Mood normal.         Behavior: Behavior normal.         Thought Content: Thought content normal.         Judgment: Judgment normal.         Plan       Patient presents with complaint of back pain. This is a result of falling on black ICE. Onset of pain was 2014  ago and has been gradually worsening since 1 year after another fall. The pain is located in right lower back, described as sharp and pins and neeles and rated as moderate and severe, with radiation posterior Rt thigh . Symptoms include no other symptoms. The patient denies weakness, numbness, incontinence. The patient denies other injuries. Care prior to arrival consisted of PT 4 months ago with minimal relief. O/E: Has braces both amkles from prior meniscus repairs to prevent external rotations. Good gait able to walk on Toes and Heels, Good rom Flexion extension of lumbar area painful, SLRs not painfull , Tender facets both sides , No SI Joint pain, Gainslins not painful. Plan facet blocks and follow.

## 2022-08-10 ENCOUNTER — TELEPHONE (OUTPATIENT)
Dept: PAIN MANAGEMENT | Age: 42
End: 2022-08-10

## 2022-08-10 NOTE — TELEPHONE ENCOUNTER
ORDER PLACED:    Date: 8/9/22  Description: RIGHT L4-5,L5-S1 FACET  Order Number: [id number]  Ordering Provider: [facility]  Performing Provider: [facility]  CPT Codes: 44299,87070  ICD10 Codes: O17.450

## 2022-08-10 NOTE — TELEPHONE ENCOUNTER
BENEFITS:    [facility]  Phone: [phone]  Contact Name: JASVIR Vasquez Date: 1/1/22     Plan year: CAL YEAR  Deductible: N/A      Deductible Met: N/A  Allowed/benefits paid at: 100% AFTER $35 COPAY  OOP: $3600 WITH $1043 MET  Freq Limits: FOLLOWS  GUIDELINES  Prior Auth Requirement: NO AUTH REQUIRED    Notes:     Call Reference #: Patricia Saldana 8/10/22    Time of call: 12:57PM

## 2022-08-10 NOTE — TELEPHONE ENCOUNTER
RIGHT L4-5,L5-S1 MBB    NO AUTH REQUIRED    OK to schedule procedure approved as above. Please note sides/levels approved and date range. (If applicable, sides/levels approved may differ from those ordered)    TO BE SCHEDULED WITH DR. Holder Other

## 2022-08-18 ENCOUNTER — PROCEDURE VISIT (OUTPATIENT)
Dept: PAIN MANAGEMENT | Age: 42
End: 2022-08-18
Payer: MEDICARE

## 2022-08-18 DIAGNOSIS — M47.817 LUMBOSACRAL SPONDYLOSIS WITHOUT MYELOPATHY: ICD-10-CM

## 2022-08-18 PROCEDURE — 64493 INJ PARAVERT F JNT L/S 1 LEV: CPT | Performed by: PAIN MEDICINE

## 2022-08-18 PROCEDURE — 64494 INJ PARAVERT F JNT L/S 2 LEV: CPT | Performed by: PAIN MEDICINE

## 2022-08-18 RX ORDER — LIDOCAINE HYDROCHLORIDE 10 MG/ML
10 INJECTION, SOLUTION EPIDURAL; INFILTRATION; INTRACAUDAL; PERINEURAL ONCE
Status: COMPLETED | OUTPATIENT
Start: 2022-08-18 | End: 2022-08-18

## 2022-08-18 RX ORDER — BUPIVACAINE HYDROCHLORIDE 5 MG/ML
30 INJECTION, SOLUTION EPIDURAL; INTRACAUDAL ONCE
Status: COMPLETED | OUTPATIENT
Start: 2022-08-18 | End: 2022-08-18

## 2022-08-18 RX ADMIN — LIDOCAINE HYDROCHLORIDE 10 MG: 10 INJECTION, SOLUTION EPIDURAL; INFILTRATION; INTRACAUDAL; PERINEURAL at 15:40

## 2022-08-18 RX ADMIN — BUPIVACAINE HYDROCHLORIDE 150 MG: 5 INJECTION, SOLUTION EPIDURAL; INTRACAUDAL at 15:40

## 2022-08-18 ASSESSMENT — PAIN - FUNCTIONAL ASSESSMENT: PAIN_FUNCTIONAL_ASSESSMENT: PREVENTS OR INTERFERES SOME ACTIVE ACTIVITIES AND ADLS

## 2022-08-18 NOTE — PROGRESS NOTES
Patient presents with complaint of back pain. This is a result of falling on black ICE. Onset of pain was 2014  ago and has been gradually worsening since 1 year after another fall. The pain is located in right lower back, described as sharp and pins and neeles and rated as moderate and severe, with radiation posterior Rt thigh . Symptoms include no other symptoms. The patient denies weakness, numbness, incontinence. The patient denies other injuries. Care prior to arrival consisted of PT 4 months ago with minimal relief. Ecam shows a surgery scar and Tender paravertebral areas more Right side. MRI 6/7/22:  L3-L4: Small disc bulge with posterior endplate spurring. Mild facet arthropathy. Mild spinal canal stenosis. Mild bilateral neuroforaminal stenosis. L4-L5: Small disc bulge with superimposed central disc protrusion resulting in severe spinal canal stenosis. Mild bilateral neuroforaminal stenosis. Postsurgical changes of posterior decompression. L5-S1: Moderate central disc protrusion abuts the bilateral S1 nerves spinal canal. No neuroforaminal or spinal canal stenosis. Postsurgical changes of posterior decompression. Plan : Rt sided Facet Blocks at L3-4,4-5. Pt explained of procedure, informed consent obtained. Placed prone on Kathrine table, A/P view was obtained to luisa skin for Needle placement, Skin was prepped with Betadine, and anesthetised with 1cc of 1% lidocaine per site,  a 26G Needle was then advanced to junctions of L3-4-5 transverse processes and Superior articulating processes and transverse process of  S1 and ala of sacrum on  Right side with good relief. Skin cleansed with wet towel and pt discharged home in stable condition.

## 2022-09-02 ENCOUNTER — OFFICE VISIT (OUTPATIENT)
Dept: PAIN MANAGEMENT | Age: 42
End: 2022-09-02
Payer: MEDICARE

## 2022-09-02 VITALS
BODY MASS INDEX: 28.1 KG/M2 | OXYGEN SATURATION: 98 % | HEART RATE: 66 BPM | WEIGHT: 212 LBS | HEIGHT: 73 IN | TEMPERATURE: 97.6 F

## 2022-09-02 DIAGNOSIS — M47.817 LUMBOSACRAL SPONDYLOSIS WITHOUT MYELOPATHY: Primary | ICD-10-CM

## 2022-09-02 DIAGNOSIS — M46.1 SACROILIITIS (HCC): ICD-10-CM

## 2022-09-02 DIAGNOSIS — M96.1 LUMBAR POST-LAMINECTOMY SYNDROME: ICD-10-CM

## 2022-09-02 DIAGNOSIS — R52 PAIN: ICD-10-CM

## 2022-09-02 PROCEDURE — 99213 OFFICE O/P EST LOW 20 MIN: CPT | Performed by: PAIN MEDICINE

## 2022-09-02 NOTE — PROGRESS NOTES
Medications   Medication Dose Route Frequency Provider Last Rate Last Admin    lidocaine 1 % injection 8 mL  8 mL Intra-artICUlar Once Harjeet Nix MD         No Known Allergies    Review of Systems  Review of Systems   Constitutional: Negative. HENT: Negative. Eyes: Negative. Respiratory: Negative. Cardiovascular: Negative. Gastrointestinal: Negative. Endocrine: Negative. Genitourinary: Negative. Musculoskeletal: Negative. Skin: Negative. Allergic/Immunologic: Negative. Neurological: Negative. Hematological: Negative. Psychiatric/Behavioral: Negative. Anxiety and depression. Bipolar   All other systems reviewed and are negative. Physical Exam     /82   Pulse 72   Temp 97.1 °F (36.2 °C)   Ht 6' (1.829 m)   Wt 213 lb (96.6 kg)   SpO2 98%   BMI 28.89 kg/m²   Physical Exam  Vitals and nursing note reviewed. Chaperone present: Mother. Constitutional:       Appearance: Normal appearance. HENT:      Head: Normocephalic. Right Ear: Ear canal normal.      Left Ear: Ear canal normal.      Nose: Nose normal.      Mouth/Throat:      Mouth: Mucous membranes are moist.   Eyes:      Extraocular Movements: Extraocular movements intact. Conjunctiva/sclera: Conjunctivae normal.      Pupils: Pupils are equal, round, and reactive to light. Cardiovascular:      Rate and Rhythm: Normal rate and regular rhythm. Pulses: Normal pulses. Heart sounds: Normal heart sounds. Pulmonary:      Effort: Pulmonary effort is normal.      Breath sounds: Normal breath sounds. Abdominal:      General: Abdomen is flat. Bowel sounds are normal.      Palpations: Abdomen is soft. Musculoskeletal:         General: Normal range of motion. Cervical back: Normal range of motion and neck supple. Comments: Has braces both amkles from prior meniscus repairs to prevent external rotations.   Good gait able to walk on Toes and Heels, Good rom Flexion extension of lumbar area painful, SLRs not painfull , Tender facets both sides , Has SI Joint pain today, Gainslins  painful Right side. Skin:     General: Skin is warm. Capillary Refill: Capillary refill takes less than 2 seconds. Neurological:      General: No focal deficit present. Mental Status: He is alert. Mental status is at baseline. He is oriented x 3  Psychiatric:         Mood and Affect: Mood normal.         Behavior: Behavior normal.         Thought Content: Thought content normal.         Judgment: Judgment normal.         Plan     Patient presents with complaint of back pain. This is a result of falling on black ICE. Onset of pain was 2014  ago and has been gradually worsening since 1 year after another fall. The pain is located in right lower back, described as sharp and pins and neeles and rated as moderate and severe, with radiation posterior Rt thigh . Symptoms include no other symptoms. The patient denies weakness, numbness, incontinence. The patient denies other injuries. Care prior to arrival consisted of PT 4 months ago with minimal relief. O/E: Has braces both amkles from prior meniscus repairs to prevent external rotations. Good gait able to walk on Toes and Heels, Good rom Flexion extension of lumbar area painful, SLRs not painfull , Tender facets both sides , No SI Joint pain, Gainslins not painful. Facet blocks 2 weeks ago Gave him good relief still no pain has residual pain lower than this area, will plan on SI Joint injection and will also Get  an EMG.

## 2022-09-06 ENCOUNTER — TELEPHONE (OUTPATIENT)
Dept: PAIN MANAGEMENT | Age: 42
End: 2022-09-06

## 2022-09-06 NOTE — TELEPHONE ENCOUNTER
RIGHT SI JOINT INJECTION    NO AUTH REQUIRED    OK to schedule procedure approved as above. Please note sides/levels approved and date range.    (If applicable, sides/levels approved may differ from those ordered)    TO BE SCHEDULED WITH Melinda Feliz

## 2022-09-06 NOTE — TELEPHONE ENCOUNTER
ORDER PLACED:    Date: 9/2/22  Description: RIGHT SI JOINT INJECTION  Order Number: [id number]  Ordering Provider: [facility]  Performing Provider: [facility]  CPT Codes: 15789  ICD10 Codes: M46.1

## 2022-09-06 NOTE — TELEPHONE ENCOUNTER
PER LAUREN AT [facility] [phone]  NO AUTH IS REQUIRED FOR CPT 13201 WHEN DONE AS OUTPATIENT     REF # [id number] 2/0/18 @8:02XP

## 2022-09-20 ENCOUNTER — PATIENT MESSAGE (OUTPATIENT)
Dept: FAMILY MEDICINE CLINIC | Age: 42
End: 2022-09-20

## 2022-09-23 RX ORDER — ATOMOXETINE 40 MG/1
40 CAPSULE ORAL DAILY
Qty: 30 CAPSULE | Refills: 2 | Status: SHIPPED | OUTPATIENT
Start: 2022-09-23

## 2022-11-09 ENCOUNTER — HOSPITAL ENCOUNTER (OUTPATIENT)
Dept: NEUROLOGY | Age: 42
Discharge: HOME | End: 2022-11-09
Payer: MEDICARE

## 2022-11-09 DIAGNOSIS — G60.0: ICD-10-CM

## 2022-11-09 DIAGNOSIS — M54.16 LUMBAR RADICULOPATHY: ICD-10-CM

## 2022-11-09 PROCEDURE — 95910 NRV CNDJ TEST 7-8 STUDIES: CPT

## 2022-11-09 PROCEDURE — 95886 MUSC TEST DONE W/N TEST COMP: CPT

## 2022-11-09 NOTE — PROCEDURES
[address]                      [address], [address] [facility]                             ELECTROMYOGRAM REPORT    PATIENT NAME: Bridget Pearce                      :        1980  MED REC NO:   [id number]                            ROOM:  ACCOUNT NO:   [de-identified]                           ADMIT DATE: 2022  PROVIDER:     Avel Blount MD    DATE OF EM2022    REFERRING PROVIDER:  Fabricio Ruelas MD.    REASON FOR STUDY:  The patient with numbness in the legs. He had  surgery for disk herniation in the past.  He has a positive family  history of diabetes. FINDINGS:  Motor nerve conduction velocities are normal in all the  nerves tested. F-wave latencies are borderline in the right common peroneal nerve and  delayed in other nerves tested. Distal motor latencies are normal in all the nerves tested. Distal sensory latencies could not be obtained in the left superficial  peroneal nerve and are normal in other nerves tested. Amplitudes of motor responses are decreased on stimulating the left  common peroneal and left tibial nerves. Amplitudes of sensory responses are decreased in all the nerves tested. On concentric needle electrode examination, denervation changes are  present in the L5 and S1 root distribution bilaterally consistent which seem old    Impression    The patient is developing changes of peripheral neuropathy. He has a  positive family history of diabetes and needs to be watched. If  clinically indicated, other causes of peripheral neuropathy should also  be watched for such as hypothyroidism, exposure to toxins, autoimmune  disorder, etc.  The patient has old changes of L5-S1 radiculopathy  If clinically indicated imaging of lumbar canal may be done to look for compromise of the spinal canal and/or foramina. If clinically indicated, we shall repeat the study in a year.   In the  meantime, he shall be treated symptomatically. Thank you Dr. Karin Louis for allowing me to see this patient. Please feel  free to call me if I can be of any further assistance regarding this  patient's evaluation.         Radha Moore MD      D: 11/09/2022 14:40:06       T: 11/09/2022 14:44:23     [email or web address]  Job#: [id number]     Doc#: [id number]    CC:

## 2022-11-30 ENCOUNTER — HOSPITAL ENCOUNTER (OUTPATIENT)
Dept: LAB | Age: 42
Discharge: HOME | End: 2022-11-30
Payer: MEDICARE

## 2022-11-30 LAB
ALBUMIN SERPL-MCNC: 4.4 G/DL (ref 3.5–4.6)
ALP BLD-CCNC: 97 U/L (ref 35–104)
ALT SERPL-CCNC: 33 U/L (ref 0–41)
ANION GAP SERPL CALCULATED.3IONS-SCNC: 11 MEQ/L (ref 9–15)
AST SERPL-CCNC: 22 U/L (ref 0–40)
BILIRUB SERPL-MCNC: 1.3 MG/DL (ref 0.2–0.7)
BUN BLDV-MCNC: 10 MG/DL (ref 6–20)
CALCIUM SERPL-MCNC: 8.8 MG/DL (ref 8.5–9.9)
CHLORIDE BLD-SCNC: 103 MEQ/L (ref 95–107)
CO2: 28 MEQ/L (ref 20–31)
CREAT SERPL-MCNC: 0.73 MG/DL (ref 0.7–1.2)
GFR SERPL CREATININE-BSD FRML MDRD: >60 ML/MIN/{1.73_M2}
GLOBULIN: 2 G/DL (ref 2.3–3.5)
GLUCOSE BLD-MCNC: 98 MG/DL (ref 70–99)
HBA1C MFR BLD: 5.1 % (ref 4.8–5.9)
HCT VFR BLD CALC: 47.6 % (ref 42–52)
HEMOGLOBIN: 16.5 G/DL (ref 14–18)
MCH RBC QN AUTO: 35.5 PG (ref 27–31.3)
MCHC RBC AUTO-ENTMCNC: 34.6 % (ref 33–37)
MCV RBC AUTO: 102.6 FL (ref 79–92.2)
PDW BLD-RTO: 13.3 % (ref 11.5–14.5)
PLATELET # BLD: 213 K/UL (ref 130–400)
POTASSIUM SERPL-SCNC: 3.9 MEQ/L (ref 3.4–4.9)
RBC # BLD: 4.64 M/UL (ref 4.7–6.1)
SODIUM BLD-SCNC: 142 MEQ/L (ref 135–144)
TOTAL PROTEIN: 6.4 G/DL (ref 6.3–8)
TSH SERPL DL<=0.05 MIU/L-ACNC: 2.85 UIU/ML (ref 0.44–3.86)
WBC # BLD: 4.1 K/UL (ref 4.8–10.8)

## 2022-11-30 PROCEDURE — 84155 ASSAY OF PROTEIN SERUM: CPT

## 2022-11-30 PROCEDURE — 83036 HEMOGLOBIN GLYCOSYLATED A1C: CPT

## 2022-11-30 PROCEDURE — 84165 PROTEIN E-PHORESIS SERUM: CPT

## 2022-11-30 PROCEDURE — 80053 COMPREHEN METABOLIC PANEL: CPT

## 2022-11-30 PROCEDURE — 36415 COLL VENOUS BLD VENIPUNCTURE: CPT

## 2022-11-30 PROCEDURE — 85027 COMPLETE CBC AUTOMATED: CPT

## 2022-11-30 PROCEDURE — 86039 ANTINUCLEAR ANTIBODIES (ANA): CPT

## 2022-11-30 PROCEDURE — 82746 ASSAY OF FOLIC ACID SERUM: CPT

## 2022-11-30 PROCEDURE — 84443 ASSAY THYROID STIM HORMONE: CPT

## 2022-11-30 PROCEDURE — 82306 VITAMIN D 25 HYDROXY: CPT

## 2022-11-30 PROCEDURE — 82607 VITAMIN B-12: CPT

## 2022-12-01 LAB
FOLATE: 17.9 NG/ML
VITAMIN B-12: 897 PG/ML (ref 232–1245)
VITAMIN D 25-HYDROXY: 12.4 NG/ML

## 2022-12-04 LAB
ANTINUCLEAR AB INTERPRETIVE COMMENT: NORMAL
ANTINUCLEAR ANTIBODY, HEP-2, IGG: NORMAL

## 2022-12-11 ENCOUNTER — PATIENT MESSAGE (OUTPATIENT)
Dept: FAMILY MEDICINE CLINIC | Age: 42
End: 2022-12-11

## 2022-12-12 RX ORDER — PAROXETINE HYDROCHLORIDE 40 MG/1
40 TABLET, FILM COATED ORAL EVERY MORNING
Qty: 90 TABLET | Refills: 0 | Status: SHIPPED | OUTPATIENT
Start: 2022-12-12

## 2022-12-12 NOTE — TELEPHONE ENCOUNTER
Comments:     Last Office Visit (last PCP visit):   2/4/2022    Next Visit Date:  Future Appointments   Date Time Provider Cristobal Siddiqi   12/28/2022 11:15 AM [address], MD [address]       **If hasn't been seen in over a year OR hasn't followed up according to last diabetes/ADHD visit, make appointment for patient before sending refill to provider.     Rx requested:  Requested Prescriptions     Pending Prescriptions Disp Refills    PARoxetine (PAXIL) 40 MG tablet 90 tablet 0     Sig: Take 1 tablet by mouth every morning

## 2022-12-12 NOTE — TELEPHONE ENCOUNTER
From: Sin Nagy  To: Dr. Vandana Bravo: 12/11/2022 6:43 PM EST  Subject: Refill needed     Good morning,  I have scheduled an appointment for after the holidays. I will need another 90 day refill of my Paxil (paroxatine)   Please and thank you.

## 2022-12-21 RX ORDER — ATOMOXETINE 40 MG/1
40 CAPSULE ORAL DAILY
Qty: 30 CAPSULE | Refills: 2 | Status: SHIPPED | OUTPATIENT
Start: 2022-12-21

## 2022-12-21 NOTE — TELEPHONE ENCOUNTER
Patient is requesting medication refill.  Please approve or deny this request.    Rx requested:  Requested Prescriptions     Pending Prescriptions Disp Refills    atomoxetine (STRATTERA) 40 MG capsule 30 capsule 2     Sig: Take 1 capsule by mouth daily         Last Office Visit:   2/4/2022      Next Visit Date:  Future Appointments   Date Time Provider Cristobal Siddiqi   12/28/2022 11:15 AM [address], MD Norm [address]

## 2022-12-28 ENCOUNTER — HOSPITAL ENCOUNTER (OUTPATIENT)
Dept: LAB | Age: 42
Discharge: HOME | End: 2022-12-28
Payer: MEDICARE

## 2022-12-28 ENCOUNTER — TELEPHONE (OUTPATIENT)
Dept: FAMILY MEDICINE CLINIC | Age: 42
End: 2022-12-28

## 2022-12-28 ENCOUNTER — OFFICE VISIT (OUTPATIENT)
Dept: FAMILY MEDICINE CLINIC | Age: 42
End: 2022-12-28
Payer: MEDICARE

## 2022-12-28 VITALS
BODY MASS INDEX: 29.39 KG/M2 | DIASTOLIC BLOOD PRESSURE: 84 MMHG | WEIGHT: 222.8 LBS | SYSTOLIC BLOOD PRESSURE: 118 MMHG | OXYGEN SATURATION: 99 % | TEMPERATURE: 98 F | HEART RATE: 73 BPM

## 2022-12-28 DIAGNOSIS — R07.89 RIGHT-SIDED CHEST WALL PAIN: ICD-10-CM

## 2022-12-28 DIAGNOSIS — R17 ELEVATED BILIRUBIN: ICD-10-CM

## 2022-12-28 DIAGNOSIS — K21.9 GASTROESOPHAGEAL REFLUX DISEASE WITHOUT ESOPHAGITIS: ICD-10-CM

## 2022-12-28 DIAGNOSIS — R17 SERUM TOTAL BILIRUBIN ELEVATED: ICD-10-CM

## 2022-12-28 DIAGNOSIS — E80.6 BILIRUBINEMIA: ICD-10-CM

## 2022-12-28 DIAGNOSIS — R07.89 RIGHT-SIDED CHEST WALL PAIN: Primary | ICD-10-CM

## 2022-12-28 DIAGNOSIS — D72.819 LEUKOPENIA, UNSPECIFIED TYPE: ICD-10-CM

## 2022-12-28 LAB
ALBUMIN SERPL-MCNC: 4.3 G/DL (ref 3.5–4.6)
ALP BLD-CCNC: 95 U/L (ref 35–104)
ALT SERPL-CCNC: 36 U/L (ref 0–41)
ANION GAP SERPL CALCULATED.3IONS-SCNC: 12 MEQ/L (ref 9–15)
AST SERPL-CCNC: 24 U/L (ref 0–40)
BASOPHILS ABSOLUTE: 0 K/UL (ref 0–0.2)
BASOPHILS RELATIVE PERCENT: 0.9 %
BILIRUB SERPL-MCNC: 1 MG/DL (ref 0.2–0.7)
BILIRUBIN DIRECT: <0.2 MG/DL (ref 0–0.4)
BILIRUBIN, INDIRECT: ABNORMAL MG/DL (ref 0–0.6)
BUN BLDV-MCNC: 13 MG/DL (ref 6–20)
CALCIUM SERPL-MCNC: 8.8 MG/DL (ref 8.5–9.9)
CHLORIDE BLD-SCNC: 100 MEQ/L (ref 95–107)
CO2: 27 MEQ/L (ref 20–31)
CREAT SERPL-MCNC: 0.85 MG/DL (ref 0.7–1.2)
EOSINOPHILS ABSOLUTE: 0.1 K/UL (ref 0–0.7)
EOSINOPHILS RELATIVE PERCENT: 3.2 %
GFR SERPL CREATININE-BSD FRML MDRD: >60 ML/MIN/{1.73_M2}
GLOBULIN: 2.3 G/DL (ref 2.3–3.5)
GLUCOSE BLD-MCNC: 109 MG/DL (ref 70–99)
HCT VFR BLD CALC: 46.6 % (ref 42–52)
HEMOGLOBIN: 16.2 G/DL (ref 14–18)
LYMPHOCYTES ABSOLUTE: 1.4 K/UL (ref 1–4.8)
LYMPHOCYTES RELATIVE PERCENT: 29.1 %
MCH RBC QN AUTO: 35.7 PG (ref 27–31.3)
MCHC RBC AUTO-ENTMCNC: 34.7 % (ref 33–37)
MCV RBC AUTO: 102.9 FL (ref 79–92.2)
MONOCYTES ABSOLUTE: 0.3 K/UL (ref 0.2–0.8)
MONOCYTES RELATIVE PERCENT: 7 %
NEUTROPHILS ABSOLUTE: 2.8 K/UL (ref 1.4–6.5)
NEUTROPHILS RELATIVE PERCENT: 59.8 %
PDW BLD-RTO: 13.1 % (ref 11.5–14.5)
PLATELET # BLD: 230 K/UL (ref 130–400)
POTASSIUM SERPL-SCNC: 3.8 MEQ/L (ref 3.4–4.9)
RBC # BLD: 4.53 M/UL (ref 4.7–6.1)
SODIUM BLD-SCNC: 139 MEQ/L (ref 135–144)
TOTAL PROTEIN: 6.6 G/DL (ref 6.3–8)
WBC # BLD: 4.7 K/UL (ref 4.8–10.8)

## 2022-12-28 PROCEDURE — 82247 BILIRUBIN TOTAL: CPT

## 2022-12-28 PROCEDURE — 99214 OFFICE O/P EST MOD 30 MIN: CPT | Performed by: FAMILY MEDICINE

## 2022-12-28 PROCEDURE — 82248 BILIRUBIN DIRECT: CPT

## 2022-12-28 PROCEDURE — 80053 COMPREHEN METABOLIC PANEL: CPT

## 2022-12-28 PROCEDURE — 36415 COLL VENOUS BLD VENIPUNCTURE: CPT

## 2022-12-28 PROCEDURE — 85025 COMPLETE CBC W/AUTO DIFF WBC: CPT

## 2022-12-28 RX ORDER — OMEPRAZOLE 20 MG/1
20 CAPSULE, DELAYED RELEASE ORAL
Qty: 30 CAPSULE | Refills: 0 | Status: SHIPPED | OUTPATIENT
Start: 2022-12-28

## 2022-12-28 SDOH — ECONOMIC STABILITY: FOOD INSECURITY: WITHIN THE PAST 12 MONTHS, YOU WORRIED THAT YOUR FOOD WOULD RUN OUT BEFORE YOU GOT MONEY TO BUY MORE.: NEVER TRUE

## 2022-12-28 SDOH — ECONOMIC STABILITY: FOOD INSECURITY: WITHIN THE PAST 12 MONTHS, THE FOOD YOU BOUGHT JUST DIDN'T LAST AND YOU DIDN'T HAVE MONEY TO GET MORE.: NEVER TRUE

## 2022-12-28 ASSESSMENT — PATIENT HEALTH QUESTIONNAIRE - PHQ9
8. MOVING OR SPEAKING SO SLOWLY THAT OTHER PEOPLE COULD HAVE NOTICED. OR THE OPPOSITE, BEING SO FIGETY OR RESTLESS THAT YOU HAVE BEEN MOVING AROUND A LOT MORE THAN USUAL: 0
4. FEELING TIRED OR HAVING LITTLE ENERGY: 0
SUM OF ALL RESPONSES TO PHQ QUESTIONS 1-9: 0
5. POOR APPETITE OR OVEREATING: 0
6. FEELING BAD ABOUT YOURSELF - OR THAT YOU ARE A FAILURE OR HAVE LET YOURSELF OR YOUR FAMILY DOWN: 0
7. TROUBLE CONCENTRATING ON THINGS, SUCH AS READING THE NEWSPAPER OR WATCHING TELEVISION: 0
9. THOUGHTS THAT YOU WOULD BE BETTER OFF DEAD, OR OF HURTING YOURSELF: 0
3. TROUBLE FALLING OR STAYING ASLEEP: 0
SUM OF ALL RESPONSES TO PHQ9 QUESTIONS 1 & 2: 0
10. IF YOU CHECKED OFF ANY PROBLEMS, HOW DIFFICULT HAVE THESE PROBLEMS MADE IT FOR YOU TO DO YOUR WORK, TAKE CARE OF THINGS AT HOME, OR GET ALONG WITH OTHER PEOPLE: 0
2. FEELING DOWN, DEPRESSED OR HOPELESS: 0
SUM OF ALL RESPONSES TO PHQ QUESTIONS 1-9: 0
1. LITTLE INTEREST OR PLEASURE IN DOING THINGS: 0

## 2022-12-28 ASSESSMENT — SOCIAL DETERMINANTS OF HEALTH (SDOH): HOW HARD IS IT FOR YOU TO PAY FOR THE VERY BASICS LIKE FOOD, HOUSING, MEDICAL CARE, AND HEATING?: NOT HARD AT ALL

## 2022-12-28 NOTE — PROGRESS NOTES
Ct chestSubjective:      Patient ID: Chandler Miles is a 43 y.o. male who presents today for:     Chief Complaint   Patient presents with    Allergic Reaction     To water pill, states right arm went numb, had tingling in fingers     Abdominal Pain     Right side, under arm into armpit        HPI  Patient is a very pleasant 49-year-old male presents today to follow-up. He was recently evaluated by urology due to persistent nephro lithiasis. He was started on hydrochlorothiazide but states that he was not able to tolerate the medication. He has no history of hypertension. Patient also has been experiencing right-sided upper abdominal and chest pain that radiates up to his right armpit. States the pain is intermittent and at times is tender to palpation. He denies any shortness of breath, lower extremity edema.   He has had a CT scan of his abdomen which showed no pathology in the lower right lung or right kidney  Past Medical History:   Diagnosis Date    Anxiety     Bipolar disorder ([facility] [address]     Depression     Hypertension     Lumbar degenerative disc disease 2017    Lumbar radicular pain      Past Surgical History:   Procedure Laterality Date    BACK SURGERY N/A     DENTAL SURGERY      all    KNEE ARTHROSCOPY      rt knee    KNEE SURGERY       Family History   Problem Relation Age of Onset    Arthritis Mother     High Blood Pressure Mother     Diabetes Father     High Blood Pressure Father     Cancer Father     Arthritis Father      Social History     Socioeconomic History    Marital status: Single     Spouse name: Not on file    Number of children: Not on file    Years of education: Not on file    Highest education level: Not on file   Occupational History    Occupation: owns [employer]   Tobacco Use    Smoking status: Former     Years: 5.00     Types: Cigarettes     Quit date:      Years since quittin.9    Smokeless tobacco: Never    Tobacco comments:     smokes marijuana every day Substance and Sexual Activity    Alcohol use: Yes     Comment: 40-48 oz malt liquor/day    Drug use: Yes     Types: Marijuana Nahomi Teto)     Comment: daily    Sexual activity: Not Currently     Partners: Female   Other Topics Concern    Not on file   Social History Narrative    Not on file     Social Determinants of Health     Financial Resource Strain: Low Risk     Difficulty of Paying Living Expenses: Not hard at all   Food Insecurity: No Food Insecurity    Worried About Running Out of Food in the Last Year: Never true    Ran Out of Food in the Last Year: Never true   Transportation Needs: Not on file   Physical Activity: Not on file   Stress: Not on file   Social Connections: Not on file   Intimate Partner Violence: Not on file   Housing Stability: Not on file     Current Outpatient Medications on File Prior to Visit   Medication Sig Dispense Refill    vitamin D3 (CHOLECALCIFEROL) 125 MCG (5000 UT) TABS tablet Take 5,000 Units by mouth daily      atomoxetine (STRATTERA) 40 MG capsule Take 1 capsule by mouth daily 30 capsule 2    PARoxetine (PAXIL) 40 MG tablet Take 1 tablet by mouth every morning 90 tablet 0    B Complex-C (SUPER B COMPLEX PO)        Current Facility-Administered Medications on File Prior to Visit   Medication Dose Route Frequency Provider Last Rate Last Admin    lidocaine 1 % injection 8 mL  8 mL Intra-artICUlar Once Vanessa Ribeiro MD           Allergies:  Patient has no known allergies. Review of Systems   Constitutional:  Negative for activity change, appetite change and fatigue. Respiratory:  Negative for apnea, cough, chest tightness and shortness of breath. Cardiovascular:  Positive for chest pain. Negative for palpitations and leg swelling. Gastrointestinal:  Negative for abdominal pain, blood in stool, constipation, diarrhea, nausea and vomiting. Musculoskeletal:  Negative for arthralgias. Neurological:  Negative for seizures and headaches.    Psychiatric/Behavioral: Negative for hallucinations and suicidal ideas. Objective:   /84   Pulse 73   Temp 98 °F (36.7 °C) (Infrared)   Wt 222 lb 12.8 oz (101.1 kg)   SpO2 99%   BMI 29.39 kg/m²     Physical Exam  Vitals and nursing note reviewed. Constitutional:       General: He is not in acute distress. Appearance: Normal appearance. He is well-developed. He is not diaphoretic. HENT:      Head: Normocephalic and atraumatic. Nose: Nose normal.      Mouth/Throat:      Mouth: Mucous membranes are moist.      Pharynx: Oropharynx is clear. Eyes:      Conjunctiva/sclera: Conjunctivae normal.      Pupils: Pupils are equal, round, and reactive to light. Cardiovascular:      Rate and Rhythm: Normal rate and regular rhythm. Heart sounds: Normal heart sounds. No murmur heard. No friction rub. No gallop. Pulmonary:      Effort: Pulmonary effort is normal. No respiratory distress. Breath sounds: Normal breath sounds. No wheezing or rales. Chest:      Chest wall: Tenderness present. Abdominal:      General: Abdomen is flat. Bowel sounds are normal.      Palpations: Abdomen is soft. Tenderness: There is no abdominal tenderness. Musculoskeletal:      Cervical back: Normal range of motion. Skin:     General: Skin is warm and dry. Neurological:      Mental Status: He is alert and oriented to person, place, and time. Psychiatric:         Behavior: Behavior normal.         Thought Content: Thought content normal.         Judgment: Judgment normal.       Assessment & Plan:     1. Right-sided chest wall pain  Unclear etiology: We will obtain a CT scan of the chest with shared decision making  - CT CHEST WO CONTRAST; Future  - CBC with Auto Differential; Future  - Comprehensive Metabolic Panel; Future    2. Gastroesophageal reflux disease without esophagitis  Will place patient on PPI for a month  - omeprazole (PRILOSEC) 20 MG delayed release capsule;  Take 1 capsule by mouth every morning (before breakfast)  Dispense: 30 capsule; Refill: 0    3. Elevated bilirubin  Will follow-up  - Comprehensive Metabolic Panel; Future    4. Leukopenia, unspecified type  We will follow-up repeat CBC    5. Serum total bilirubin elevated  - Bilirubin Total Direct & Indirect; Future    6. Bilirubinemia  - Bilirubin Total Direct & Indirect; Future    Return in about 3 months (around 3/28/2023).     Claudio Johns MD

## 2022-12-31 DIAGNOSIS — R17 ELEVATED BILIRUBIN: ICD-10-CM

## 2022-12-31 DIAGNOSIS — D75.89 MACROCYTOSIS WITHOUT ANEMIA: Primary | ICD-10-CM

## 2023-01-18 DIAGNOSIS — R07.89 RIGHT-SIDED CHEST WALL PAIN: Primary | ICD-10-CM

## 2023-01-20 ENCOUNTER — OFFICE VISIT (OUTPATIENT)
Dept: GASTROENTEROLOGY | Age: 43
End: 2023-01-20

## 2023-01-20 ENCOUNTER — HOSPITAL ENCOUNTER (OUTPATIENT)
Dept: LAB | Age: 43
Discharge: HOME | End: 2023-01-20
Payer: MEDICARE

## 2023-01-20 VITALS
HEART RATE: 71 BPM | SYSTOLIC BLOOD PRESSURE: 112 MMHG | BODY MASS INDEX: 29.55 KG/M2 | DIASTOLIC BLOOD PRESSURE: 82 MMHG | OXYGEN SATURATION: 98 % | WEIGHT: 224 LBS

## 2023-01-20 DIAGNOSIS — R79.89 ABNORMAL LFTS: ICD-10-CM

## 2023-01-20 DIAGNOSIS — E80.4 GILBERT'S SYNDROME: ICD-10-CM

## 2023-01-20 DIAGNOSIS — E80.4 GILBERT'S SYNDROME: Primary | ICD-10-CM

## 2023-01-20 LAB
ALBUMIN SERPL-MCNC: 4.4 G/DL (ref 3.5–4.6)
ALP BLD-CCNC: 91 U/L (ref 35–104)
ALT SERPL-CCNC: 28 U/L (ref 0–41)
AST SERPL-CCNC: 21 U/L (ref 0–40)
BILIRUB SERPL-MCNC: 1.4 MG/DL (ref 0.2–0.7)
BILIRUBIN DIRECT: <0.2 MG/DL (ref 0–0.4)
BILIRUBIN, INDIRECT: ABNORMAL MG/DL (ref 0–0.6)
TOTAL PROTEIN: 6.7 G/DL (ref 6.3–8)

## 2023-01-20 PROCEDURE — 80076 HEPATIC FUNCTION PANEL: CPT

## 2023-01-20 PROCEDURE — 36415 COLL VENOUS BLD VENIPUNCTURE: CPT

## 2023-01-20 ASSESSMENT — ENCOUNTER SYMPTOMS
GASTROINTESTINAL NEGATIVE: 1
CONSTIPATION: 0
RESPIRATORY NEGATIVE: 1
NAUSEA: 0
EYES NEGATIVE: 1
ANAL BLEEDING: 0
ABDOMINAL PAIN: 0
RECTAL PAIN: 0
DIARRHEA: 0
ABDOMINAL DISTENTION: 0
VOMITING: 0
BLOOD IN STOOL: 0

## 2023-01-20 NOTE — PROGRESS NOTES
Gastroenterology Clinic Visit    Alonzo Case  [id number]  Chief Complaint   Patient presents with    Elevated Hepatic Enzymes       HPI: 43 y.o. male presents to the clinic with elevated serum bilirubin. On reviewing the blood test patient had mildly elevated serum bilirubin starting in January 2014 and since then had multiple blood test.  Out of all this 1 time patient had a mild elevation of ALT and alk phos otherwise transaminases and alkaline phosphatase are all normal, do not see fractionation of serum bilirubin, this is symptomatic, patient had altered bowel habits and had colonoscopy by Dr. Zita Tilley which reportedly showed some chronic inflammation and patient was placed on Apriso for about 6 months, it was discontinued after patient had a second colonoscopy which was reportedly normal, patient is not on any mesalamine's currently. Patient is on omeprazole 2 mg once a day for GERD symptoms, social history used to smoke and drink in the past currently does not use tobacco or alcohol, surgical history includes knee and back surgery, cholecystectomy. Family history negative for liver disease    Review of Systems   Constitutional: Negative. HENT: Negative. Eyes: Negative. Respiratory: Negative. Gastrointestinal: Negative. Negative for abdominal distention, abdominal pain, anal bleeding, blood in stool, constipation, diarrhea, nausea, rectal pain and vomiting. Genitourinary: Negative. Musculoskeletal: Negative. Neurological: Negative. Psychiatric/Behavioral: Negative.           History of anxiety and depression      Past Medical History:   Diagnosis Date    Anxiety     Bipolar disorder ([facility] [address]     Depression     Hypertension     Lumbar degenerative disc disease 09/21/2017    Lumbar radicular pain       Past Surgical History:   Procedure Laterality Date    BACK SURGERY N/A 2014    DENTAL SURGERY  2010    all    KNEE ARTHROSCOPY  1995    rt knee    KNEE SURGERY       Current Outpatient Medications on File Prior to Visit   Medication Sig Dispense Refill    vitamin D3 (CHOLECALCIFEROL) 125 MCG (5000 UT) TABS tablet Take 5,000 Units by mouth daily      omeprazole (PRILOSEC) 20 MG delayed release capsule Take 1 capsule by mouth every morning (before breakfast) 30 capsule 0    atomoxetine (STRATTERA) 40 MG capsule Take 1 capsule by mouth daily 30 capsule 2    PARoxetine (PAXIL) 40 MG tablet Take 1 tablet by mouth every morning 90 tablet 0    B Complex-C (SUPER B COMPLEX PO)        Current Facility-Administered Medications on File Prior to Visit   Medication Dose Route Frequency Provider Last Rate Last Admin    lidocaine 1 % injection 8 mL  8 mL Intra-artICUlar Once Robyn Stephens MD         Family History   Problem Relation Age of Onset    Arthritis Mother     High Blood Pressure Mother     Diabetes Father     High Blood Pressure Father     Cancer Father     Arthritis Father       Social History     Socioeconomic History    Marital status: Single     Spouse name: None    Number of children: None    Years of education: None    Highest education level: None   Occupational History    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   Tobacco Use    Smoking status: Former     Years: 5.00     Types: Cigarettes     Quit date:      Years since quittin.0    Smokeless tobacco: Never    Tobacco comments:     smokes marijuana every day   Substance and Sexual Activity    Alcohol use: Yes     Comment: 40-48 oz malt liquor/day    Drug use: Yes     Types: Marijuana (Weed)     Comment: daily    Sexual activity: Not Currently     Partners: Female     Social Determinants of Health     Financial Resource Strain: Low Risk     Difficulty of Paying Living Expenses: Not hard at all   Food Insecurity: No Food Insecurity    Worried About Running Out of Food in the Last Year: Never true    Ran Out of Food in the Last Year: Never true       Blood pressure 112/82, pulse 71, weight 224 lb (101.6 kg), SpO2 98 %.     Physical Exam  Constitutional:       Appearance: He is well-developed. HENT:      Head: Normocephalic. Eyes:      Pupils: Pupils are equal, round, and reactive to light. Cardiovascular:      Rate and Rhythm: Normal rate and regular rhythm. Heart sounds: Normal heart sounds. Pulmonary:      Effort: Pulmonary effort is normal.      Breath sounds: Normal breath sounds. Abdominal:      General: Bowel sounds are normal.      Palpations: Abdomen is soft. Comments: Soft nontender no palpable mass   Skin:     General: Skin is warm and dry. Comments: Erythematous and scaly rash over the face probably psoriasis   Neurological:      Mental Status: He is alert. Laboratory, Pathology, Radiology reviewed indetail with relevant important investigations summarized below:  Lab Results   Component Value Date    WBC 4.7 (L) 12/28/2022    HGB 16.2 12/28/2022    HCT 46.6 12/28/2022    .9 (H) 12/28/2022     12/28/2022     Lab Results   Component Value Date    ALT 36 12/28/2022    AST 24 12/28/2022    ALKPHOS 95 12/28/2022    BILITOT 1.0 (H) 12/28/2022       No results found. Endoscopic investigations:     Assessmentand Plan:  43 y.o. male with mildly elevated serum bilirubin with normal transaminases and alk phos. Most probably this is Gilbert's syndrome.,  Will repeat LFT and also fractionate the serum bilirubin. Patient to call me after the blood test   Diagnosis Orders   1. Gilbert's syndrome  Hepatic Function Panel    Bilirubin Total Direct & Indirect        Return for , Patient will call after the blood test.    Tobias Quintero MD   Staff Gastroenterologist  [facility]    Please note this report has been partially produced using speech recognition software and may cause contain errors related to thatsystem including grammar, punctuation and spelling as well as words and phrases that may seem inappropriate.  If there are questions or concerns please feel free to contact me to clarify.

## 2023-03-13 ENCOUNTER — PATIENT MESSAGE (OUTPATIENT)
Dept: FAMILY MEDICINE CLINIC | Age: 43
End: 2023-03-13

## 2023-03-14 RX ORDER — ATOMOXETINE 40 MG/1
40 CAPSULE ORAL DAILY
Qty: 30 CAPSULE | Refills: 2 | Status: SHIPPED | OUTPATIENT
Start: 2023-03-14

## 2023-03-14 NOTE — TELEPHONE ENCOUNTER
From: Juliet Smith  To: Dr. Lucina Olmedo: 3/13/2023 7:02 PM EDT  Subject: Refill needed     Hello,  I have 10 days left on my last refill. Im not sure if a refill can be filled this early. Please let me know.   Thank you     Dolly Calzada

## 2023-03-14 NOTE — TELEPHONE ENCOUNTER
Pt is requesting medication refill. Please approve or deny this request.    Rx requested:  Requested Prescriptions     Pending Prescriptions Disp Refills    atomoxetine (STRATTERA) 40 MG capsule 30 capsule 2     Sig: Take 1 capsule by mouth daily         Last Office Visit:   12/28/2022      Next Visit Date:  No future appointments.

## 2023-04-28 ENCOUNTER — HOSPITAL ENCOUNTER (OUTPATIENT)
Age: 43
End: 2023-04-28
Payer: MEDICARE

## 2023-04-28 ENCOUNTER — HOSPITAL ENCOUNTER (OUTPATIENT)
Dept: GENERAL RADIOLOGY | Age: 43
End: 2023-04-28
Payer: MEDICARE

## 2023-04-28 DIAGNOSIS — R07.89 RIGHT-SIDED CHEST WALL PAIN: ICD-10-CM

## 2023-04-28 PROCEDURE — 71046 X-RAY EXAM CHEST 2 VIEWS: CPT

## 2023-06-18 NOTE — TELEPHONE ENCOUNTER
Pharmacy is requesting medication refill. Please approve or deny this request.    Rx requested:  Requested Prescriptions     Pending Prescriptions Disp Refills    atomoxetine (STRATTERA) 40 MG capsule [Pharmacy Med Name: Atomoxetine HCl 40 MG Oral Capsule] 30 capsule 0     Sig: Take 1 capsule by mouth daily         Last Office Visit:   12/28/2022      Next Visit Date:  No future appointments.

## 2023-06-19 RX ORDER — ATOMOXETINE 40 MG/1
40 CAPSULE ORAL DAILY
Qty: 90 CAPSULE | Refills: 1 | Status: SHIPPED | OUTPATIENT
Start: 2023-06-19

## 2023-07-13 ENCOUNTER — OFFICE VISIT (OUTPATIENT)
Dept: FAMILY MEDICINE CLINIC | Age: 43
End: 2023-07-13
Payer: MEDICARE

## 2023-07-13 VITALS
DIASTOLIC BLOOD PRESSURE: 70 MMHG | HEART RATE: 76 BPM | SYSTOLIC BLOOD PRESSURE: 100 MMHG | BODY MASS INDEX: 28.87 KG/M2 | OXYGEN SATURATION: 99 % | TEMPERATURE: 97 F | WEIGHT: 218.8 LBS

## 2023-07-13 DIAGNOSIS — J34.2 DEVIATED SEPTUM: ICD-10-CM

## 2023-07-13 DIAGNOSIS — N20.0 RENAL CALCULI: ICD-10-CM

## 2023-07-13 DIAGNOSIS — Q15.9 EYE ABNORMALITY: Primary | ICD-10-CM

## 2023-07-13 DIAGNOSIS — R43.1 ABNORMAL SMELL: ICD-10-CM

## 2023-07-13 DIAGNOSIS — H92.03 OTALGIA OF BOTH EARS: ICD-10-CM

## 2023-07-13 PROCEDURE — 99214 OFFICE O/P EST MOD 30 MIN: CPT | Performed by: FAMILY MEDICINE

## 2023-07-13 SDOH — ECONOMIC STABILITY: FOOD INSECURITY: WITHIN THE PAST 12 MONTHS, YOU WORRIED THAT YOUR FOOD WOULD RUN OUT BEFORE YOU GOT MONEY TO BUY MORE.: NEVER TRUE

## 2023-07-13 SDOH — ECONOMIC STABILITY: HOUSING INSECURITY
IN THE LAST 12 MONTHS, WAS THERE A TIME WHEN YOU DID NOT HAVE A STEADY PLACE TO SLEEP OR SLEPT IN A SHELTER (INCLUDING NOW)?: NO

## 2023-07-13 SDOH — ECONOMIC STABILITY: FOOD INSECURITY: WITHIN THE PAST 12 MONTHS, THE FOOD YOU BOUGHT JUST DIDN'T LAST AND YOU DIDN'T HAVE MONEY TO GET MORE.: NEVER TRUE

## 2023-07-13 SDOH — ECONOMIC STABILITY: INCOME INSECURITY: HOW HARD IS IT FOR YOU TO PAY FOR THE VERY BASICS LIKE FOOD, HOUSING, MEDICAL CARE, AND HEATING?: NOT HARD AT ALL

## 2023-07-13 ASSESSMENT — PATIENT HEALTH QUESTIONNAIRE - PHQ9
7. TROUBLE CONCENTRATING ON THINGS, SUCH AS READING THE NEWSPAPER OR WATCHING TELEVISION: 0
SUM OF ALL RESPONSES TO PHQ QUESTIONS 1-9: 0
5. POOR APPETITE OR OVEREATING: 0
6. FEELING BAD ABOUT YOURSELF - OR THAT YOU ARE A FAILURE OR HAVE LET YOURSELF OR YOUR FAMILY DOWN: 0
10. IF YOU CHECKED OFF ANY PROBLEMS, HOW DIFFICULT HAVE THESE PROBLEMS MADE IT FOR YOU TO DO YOUR WORK, TAKE CARE OF THINGS AT HOME, OR GET ALONG WITH OTHER PEOPLE: 0
SUM OF ALL RESPONSES TO PHQ QUESTIONS 1-9: 0
1. LITTLE INTEREST OR PLEASURE IN DOING THINGS: 0
2. FEELING DOWN, DEPRESSED OR HOPELESS: 0
SUM OF ALL RESPONSES TO PHQ9 QUESTIONS 1 & 2: 0
SUM OF ALL RESPONSES TO PHQ QUESTIONS 1-9: 0
9. THOUGHTS THAT YOU WOULD BE BETTER OFF DEAD, OR OF HURTING YOURSELF: 0
4. FEELING TIRED OR HAVING LITTLE ENERGY: 0
8. MOVING OR SPEAKING SO SLOWLY THAT OTHER PEOPLE COULD HAVE NOTICED. OR THE OPPOSITE, BEING SO FIGETY OR RESTLESS THAT YOU HAVE BEEN MOVING AROUND A LOT MORE THAN USUAL: 0
3. TROUBLE FALLING OR STAYING ASLEEP: 0
SUM OF ALL RESPONSES TO PHQ QUESTIONS 1-9: 0

## 2023-07-16 ASSESSMENT — ENCOUNTER SYMPTOMS
NAUSEA: 0
SINUS PRESSURE: 0
CHEST TIGHTNESS: 0
APNEA: 0
DIARRHEA: 0
ABDOMINAL PAIN: 0
SORE THROAT: 0
BLOOD IN STOOL: 0
CONSTIPATION: 0
FACIAL SWELLING: 0
RHINORRHEA: 0
VOMITING: 0
COUGH: 0
SHORTNESS OF BREATH: 0

## 2023-09-11 RX ORDER — PAROXETINE HYDROCHLORIDE 40 MG/1
40 TABLET, FILM COATED ORAL EVERY MORNING
Qty: 90 TABLET | Refills: 1 | Status: SHIPPED | OUTPATIENT
Start: 2023-09-11

## 2023-09-11 NOTE — TELEPHONE ENCOUNTER
Comments:     Last Office Visit (last PCP visit):   7/13/2023    Next Visit Date:  No future appointments. **If hasn't been seen in over a year OR hasn't followed up according to last diabetes/ADHD visit, make appointment for patient before sending refill to provider.     Rx requested:  Requested Prescriptions     Pending Prescriptions Disp Refills    PARoxetine (PAXIL) 40 MG tablet [Pharmacy Med Name: PARoxetine HCl 40 MG Oral Tablet] 90 tablet 0     Sig: TAKE 1 TABLET BY MOUTH ONCE DAILY IN THE MORNING

## 2023-10-20 ENCOUNTER — PATIENT MESSAGE (OUTPATIENT)
Dept: FAMILY MEDICINE CLINIC | Age: 43
End: 2023-10-20

## 2023-10-20 NOTE — TELEPHONE ENCOUNTER
From: Marissa Lozano  To: Dr. Willis Kim: 10/20/2023 10:17 AM EDT  Subject: Refill needed     Good morning,  My name is Emma Arias. I am in need of a refill of my Atomoxatine prescription. I currently have 2 doses left, and 1 refill available on the current prescription. Prescription number [id number]. [facility] in [facility] is my current pharmacy.   Thank you

## 2023-10-22 RX ORDER — ATOMOXETINE 40 MG/1
40 CAPSULE ORAL DAILY
Qty: 90 CAPSULE | Refills: 1 | Status: SHIPPED | OUTPATIENT
Start: 2023-10-22

## 2023-12-12 ENCOUNTER — PATIENT MESSAGE (OUTPATIENT)
Dept: FAMILY MEDICINE CLINIC | Age: 43
End: 2023-12-12

## 2023-12-13 RX ORDER — PAROXETINE HYDROCHLORIDE 40 MG/1
40 TABLET, FILM COATED ORAL EVERY MORNING
Qty: 90 TABLET | Refills: 1 | Status: SHIPPED | OUTPATIENT
Start: 2023-12-13

## 2023-12-13 NOTE — TELEPHONE ENCOUNTER
Comments:     Last Office Visit (last PCP visit):   7/13/2023    Next Visit Date:  No future appointments. **If hasn't been seen in over a year OR hasn't followed up according to last diabetes/ADHD visit, make appointment for patient before sending refill to provider.     Rx requested:  Requested Prescriptions     Pending Prescriptions Disp Refills    PARoxetine (PAXIL) 40 MG tablet 90 tablet 1     Sig: Take 1 tablet by mouth every morning

## 2023-12-13 NOTE — TELEPHONE ENCOUNTER
From: Qiana Hutchins  To: Dr. Didier Gonzalez: 12/12/2023 6:23 PM EST  Subject: Refill needed     Good evening,  My name is Soni Sunita. I need a refill of my paroxetine prescription. 3 days left currently. [address] [place] [address] in [place]. If the doctor requires me to set an appointment, I will be in touch to do so.  Thank you     Soni Dorsey

## 2024-01-08 ASSESSMENT — PATIENT HEALTH QUESTIONNAIRE - PHQ9
SUM OF ALL RESPONSES TO PHQ QUESTIONS 1-9: 1
1. LITTLE INTEREST OR PLEASURE IN DOING THINGS: 1
2. FEELING DOWN, DEPRESSED OR HOPELESS: NOT AT ALL
2. FEELING DOWN, DEPRESSED OR HOPELESS: 0
SUM OF ALL RESPONSES TO PHQ9 QUESTIONS 1 & 2: 1
SUM OF ALL RESPONSES TO PHQ9 QUESTIONS 1 & 2: 1
1. LITTLE INTEREST OR PLEASURE IN DOING THINGS: SEVERAL DAYS
SUM OF ALL RESPONSES TO PHQ QUESTIONS 1-9: 1

## 2024-01-11 ENCOUNTER — OFFICE VISIT (OUTPATIENT)
Dept: FAMILY MEDICINE CLINIC | Age: 44
End: 2024-01-11
Payer: MEDICARE

## 2024-01-11 VITALS
HEART RATE: 70 BPM | OXYGEN SATURATION: 100 % | HEIGHT: 73 IN | DIASTOLIC BLOOD PRESSURE: 70 MMHG | TEMPERATURE: 97.1 F | SYSTOLIC BLOOD PRESSURE: 108 MMHG | WEIGHT: 213.4 LBS | BODY MASS INDEX: 28.28 KG/M2

## 2024-01-11 DIAGNOSIS — R59.0 SUBMANDIBULAR LYMPHADENOPATHY: ICD-10-CM

## 2024-01-11 DIAGNOSIS — F90.9 ATTENTION DEFICIT HYPERACTIVITY DISORDER (ADHD), UNSPECIFIED ADHD TYPE: Primary | ICD-10-CM

## 2024-01-11 PROCEDURE — 99214 OFFICE O/P EST MOD 30 MIN: CPT | Performed by: FAMILY MEDICINE

## 2024-01-11 RX ORDER — ATOMOXETINE 40 MG/1
40 CAPSULE ORAL DAILY
Qty: 90 CAPSULE | Refills: 1 | Status: SHIPPED | OUTPATIENT
Start: 2024-01-11

## 2024-01-11 NOTE — PROGRESS NOTES
Subjective:      Patient ID: Dnucan Tripathi is a 43 y.o. male who presents today for:     Chief Complaint   Patient presents with    Follow-up       HPI  Duncan Tripathi is a very pleasant 43-year-old male presents today to follow-up on ADHD.  He states his symptoms are well-controlled on his current medication.  Additionally, patient noticed right-sided submandibular swelling.  He states that this was evaluated by his dentist who obtained an x-ray which did not see any abnormality.  Denies any pain, fever or chills  Past Medical History:   Diagnosis Date    Anxiety     Bipolar disorder (HCC)     Depression     Hypertension     Lumbar degenerative disc disease 09/21/2017    Lumbar radicular pain      Past Surgical History:   Procedure Laterality Date    BACK SURGERY N/A 2014    DENTAL SURGERY  2010    all    KNEE ARTHROSCOPY  1995    rt knee    KNEE SURGERY       Family History   Problem Relation Age of Onset    Arthritis Mother     High Blood Pressure Mother     Diabetes Father     High Blood Pressure Father     Cancer Father     Arthritis Father      Social History     Socioeconomic History    Marital status: Single     Spouse name: Not on file    Number of children: Not on file    Years of education: Not on file    Highest education level: Not on file   Occupational History    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   Tobacco Use    Smoking status: Former     Current packs/day: 0.00     Types: Cigarettes     Start date: 2016     Quit date: 2021     Years since quitting: 3.0    Smokeless tobacco: Never    Tobacco comments:     smokes marijuana every day   Substance and Sexual Activity    Alcohol use: Yes     Comment: 40-48 oz malt liquor/day    Drug use: Yes     Types: Marijuana (Weed)     Comment: daily    Sexual activity: Not Currently     Partners: Female   Other Topics Concern    Not on file   Social History Narrative    Not on file     Social Determinants of Health     Financial Resource Strain: Low Risk  (7/13/2023)

## 2024-01-13 ENCOUNTER — HOSPITAL ENCOUNTER (OUTPATIENT)
Dept: ULTRASOUND IMAGING | Age: 44
End: 2024-01-13
Payer: MEDICARE

## 2024-01-13 DIAGNOSIS — R59.0 SUBMANDIBULAR LYMPHADENOPATHY: ICD-10-CM

## 2024-01-13 PROCEDURE — 76536 US EXAM OF HEAD AND NECK: CPT

## 2024-01-21 DIAGNOSIS — E21.5 PARATHYROID ABNORMALITY (HCC): Primary | ICD-10-CM

## 2024-02-12 ENCOUNTER — HOSPITAL ENCOUNTER (OUTPATIENT)
Dept: GENERAL RADIOLOGY | Age: 44
Discharge: HOME | End: 2024-02-14
Attending: FAMILY MEDICINE
Payer: MEDICARE

## 2024-02-12 ENCOUNTER — HOSPITAL ENCOUNTER (OUTPATIENT)
Dept: LAB | Age: 44
Discharge: HOME | End: 2024-02-12
Payer: MEDICARE

## 2024-02-12 ENCOUNTER — HOSPITAL ENCOUNTER (OUTPATIENT)
Dept: ULTRASOUND IMAGING | Age: 44
Discharge: HOME | End: 2024-02-14
Attending: FAMILY MEDICINE
Payer: MEDICARE

## 2024-02-12 ENCOUNTER — OFFICE VISIT (OUTPATIENT)
Dept: FAMILY MEDICINE CLINIC | Age: 44
End: 2024-02-12
Payer: MEDICARE

## 2024-02-12 ENCOUNTER — HOSPITAL ENCOUNTER (OUTPATIENT)
Age: 44
Discharge: HOME | End: 2024-02-14
Payer: MEDICARE

## 2024-02-12 VITALS
OXYGEN SATURATION: 100 % | WEIGHT: 213 LBS | HEART RATE: 67 BPM | BODY MASS INDEX: 28.1 KG/M2 | TEMPERATURE: 97 F | DIASTOLIC BLOOD PRESSURE: 82 MMHG | SYSTOLIC BLOOD PRESSURE: 130 MMHG

## 2024-02-12 DIAGNOSIS — M25.562 ACUTE PAIN OF LEFT KNEE: ICD-10-CM

## 2024-02-12 DIAGNOSIS — M79.89 PAIN AND SWELLING OF LEFT LOWER LEG: ICD-10-CM

## 2024-02-12 DIAGNOSIS — M25.562 ACUTE PAIN OF LEFT KNEE: Primary | ICD-10-CM

## 2024-02-12 DIAGNOSIS — M79.662 PAIN AND SWELLING OF LEFT LOWER LEG: ICD-10-CM

## 2024-02-12 PROCEDURE — 93971 EXTREMITY STUDY: CPT

## 2024-02-12 PROCEDURE — 73562 X-RAY EXAM OF KNEE 3: CPT

## 2024-02-12 PROCEDURE — 99214 OFFICE O/P EST MOD 30 MIN: CPT | Performed by: FAMILY MEDICINE

## 2024-02-12 RX ORDER — NAPROXEN 250 MG/1
250 TABLET ORAL 2 TIMES DAILY PRN
Qty: 30 TABLET | Refills: 0 | Status: SHIPPED | OUTPATIENT
Start: 2024-02-12

## 2024-02-12 NOTE — PROGRESS NOTES
Subjective:      Patient ID: Duncan Tripathi is a 43 y.o. male who presents today for:     Chief Complaint   Patient presents with    Leg Injury     States he had a recent fall, swelling is better, having some pain in knee into shin, x2-3 weeks        Leg Injury  Associated symptoms include arthralgias. Pertinent negatives include no abdominal pain, chest pain, coughing, fatigue, headaches, nausea or vomiting.     Duncan Tripathi is a very pleasant 43-year-old male presents today for follow-up.  He suffered a fall approximately 2 to 3 weeks ago where he fell forward onto his left knee.  He states that he had initial swelling that has improved.  The swelling was within his knee as well as in his left calf.  He continues to have pain inferior to his patella that radiates into his shin.  He also has posterior knee pain and pain in his calf.  This is acute on chronic.  Patient denies any chest pain, or shortness of breath.  He denies any instability, locking or catching but has had some difficulty with gait.    Past Medical History:   Diagnosis Date    Anxiety     Bipolar disorder (HCC)     Depression     Hypertension     Lumbar degenerative disc disease 09/21/2017    Lumbar radicular pain      Past Surgical History:   Procedure Laterality Date    BACK SURGERY N/A 2014    DENTAL SURGERY  2010    all    KNEE ARTHROSCOPY  1995    rt knee    KNEE SURGERY       Family History   Problem Relation Age of Onset    Arthritis Mother     High Blood Pressure Mother     Diabetes Father     High Blood Pressure Father     Cancer Father     Arthritis Father      Social History     Socioeconomic History    Marital status: Single     Spouse name: Not on file    Number of children: Not on file    Years of education: Not on file    Highest education level: Not on file   Occupational History    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   Tobacco Use    Smoking status: Former     Current packs/day: 0.00     Types: Cigarettes     Start date: 2016     Quit

## 2024-02-23 ENCOUNTER — OFFICE VISIT (OUTPATIENT)
Age: 44
End: 2024-02-23
Payer: MEDICARE

## 2024-02-23 VITALS
TEMPERATURE: 97.1 F | HEIGHT: 72 IN | BODY MASS INDEX: 29.66 KG/M2 | WEIGHT: 219 LBS | SYSTOLIC BLOOD PRESSURE: 130 MMHG | DIASTOLIC BLOOD PRESSURE: 92 MMHG

## 2024-02-23 DIAGNOSIS — R22.1 NECK SWELLING: Primary | ICD-10-CM

## 2024-02-23 DIAGNOSIS — R93.89 ABNORMAL FINDING OF DIAGNOSTIC IMAGING: ICD-10-CM

## 2024-02-23 DIAGNOSIS — E21.5 PARATHYROID ABNORMALITY (HCC): ICD-10-CM

## 2024-02-23 LAB
ANION GAP SERPL CALCULATED.3IONS-SCNC: 10 MEQ/L (ref 9–15)
BUN SERPL-MCNC: 8 MG/DL (ref 6–20)
CALCIUM SERPL-MCNC: 8.9 MG/DL (ref 8.5–9.9)
CHLORIDE SERPL-SCNC: 103 MEQ/L (ref 95–107)
CO2 SERPL-SCNC: 29 MEQ/L (ref 20–31)
CREAT SERPL-MCNC: 0.77 MG/DL (ref 0.7–1.2)
GLUCOSE SERPL-MCNC: 96 MG/DL (ref 70–99)
POTASSIUM SERPL-SCNC: 3.7 MEQ/L (ref 3.4–4.9)
SODIUM SERPL-SCNC: 142 MEQ/L (ref 135–144)

## 2024-02-23 PROCEDURE — 99204 OFFICE O/P NEW MOD 45 MIN: CPT | Performed by: STUDENT IN AN ORGANIZED HEALTH CARE EDUCATION/TRAINING PROGRAM

## 2024-02-23 NOTE — PROGRESS NOTES
[id number]      8.5 - 9.9 mg/dL 9.4  9.1  8.8  8.8        No Known Allergies    Current Outpatient Medications:     naproxen (NAPROSYN) 250 MG tablet, Take 1 tablet by mouth 2 times daily as needed for Pain, Disp: 30 tablet, Rfl: 0    atomoxetine (STRATTERA) 40 MG capsule, Take 1 capsule by mouth daily, Disp: 90 capsule, Rfl: 1    PARoxetine (PAXIL) 40 MG tablet, Take 1 tablet by mouth every morning, Disp: 90 tablet, Rfl: 1    vitamin D3 (CHOLECALCIFEROL) 125 MCG (5000 UT) TABS tablet, Take 1 tablet by mouth daily, Disp: , Rfl:     B Complex-C (SUPER B COMPLEX PO), , Disp: , Rfl:    Past Medical History:   Diagnosis Date    Anxiety     Bipolar disorder (HCC)     Depression     Hypertension     Lumbar degenerative disc disease 09/21/2017    Lumbar radicular pain      Past Surgical History:   Procedure Laterality Date    BACK SURGERY N/A 2014    DENTAL SURGERY  2010    all    KNEE ARTHROSCOPY  1995    rt knee    KNEE SURGERY       Family History   Problem Relation Age of Onset    Arthritis Mother     High Blood Pressure Mother     Diabetes Father     High Blood Pressure Father     Cancer Father     Arthritis Father      Social History     Tobacco Use    Smoking status: Former     Current packs/day: 0.00     Types: Cigarettes     Start date: 2016     Quit date: 2021     Years since quitting: 3.1    Smokeless tobacco: Never    Tobacco comments:     smokes marijuana every day   Substance Use Topics    Alcohol use: Yes     Comment: 40-48 oz malt liquor/day       PMH, Surgical Hx, Family Hx, and Social Hx reviewed and updated.    Objective     Vitals:    02/23/24 1003   BP: (!) 130/92   Temp: 97.1 °F (36.2 °C)   Weight: 99.3 kg (219 lb)   Height: 1.829 m (6')        Physical Exam  Constitutional:       General: He is not in acute distress.     Appearance: Normal appearance.   HENT:      Head: Normocephalic and atraumatic.      Salivary Glands: Right salivary gland is not diffusely enlarged or tender. Left salivary gland is not

## 2024-02-25 LAB
MISCELLANEOUS LAB TEST ORDER: NORMAL
WHOPPER PROMPT: NORMAL

## 2024-03-15 ENCOUNTER — OFFICE VISIT (OUTPATIENT)
Dept: ENDOCRINOLOGY | Age: 44
End: 2024-03-15
Payer: MEDICARE

## 2024-03-15 VITALS
WEIGHT: 219 LBS | DIASTOLIC BLOOD PRESSURE: 80 MMHG | HEART RATE: 83 BPM | HEIGHT: 72 IN | SYSTOLIC BLOOD PRESSURE: 116 MMHG | BODY MASS INDEX: 29.66 KG/M2 | OXYGEN SATURATION: 96 %

## 2024-03-15 DIAGNOSIS — D35.1 PARATHYROID ADENOMA: Primary | ICD-10-CM

## 2024-03-15 PROCEDURE — 99204 OFFICE O/P NEW MOD 45 MIN: CPT | Performed by: PHYSICIAN ASSISTANT

## 2024-03-15 ASSESSMENT — ENCOUNTER SYMPTOMS
VOMITING: 0
ABDOMINAL PAIN: 0
SHORTNESS OF BREATH: 0
SINUS PRESSURE: 0
SORE THROAT: 0
RHINORRHEA: 0
NAUSEA: 0
COUGH: 0
WHEEZING: 0
DIARRHEA: 0

## 2024-03-15 NOTE — PROGRESS NOTES
3/15/2024    Assessment:       Diagnosis Orders   1. Parathyroid adenoma              PLAN:     Follow-up with PCP as scheduled  Monitor calcium and PTH level every 6 months  Follow-up with me if those should increase      No orders of the defined types were placed in this encounter.    No orders of the defined types were placed in this encounter.    No follow-ups on file.  Subjective:     Chief Complaint   Patient presents with    New Patient    Parathyroid abnormality     Vitals:    03/15/24 1159   BP: 116/80   Pulse: 83   SpO2: 96%   Weight: 99.3 kg (219 lb)   Height: 1.829 m (6')     Wt Readings from Last 3 Encounters:   03/15/24 99.3 kg (219 lb)   02/23/24 99.3 kg (219 lb)   02/12/24 96.6 kg (213 lb)     BP Readings from Last 3 Encounters:   03/15/24 116/80   02/23/24 (!) 130/92   02/12/24 130/82     This patient is a 43-year-old male presents today for evaluation of a incidental finding of enlarged parathyroid gland.  He was complaining of a submandibular lump on the right side.  A anterior neck ultrasound was ordered.  Thyroid is within normal limits with no nodules, he had incidental finding of a 6 x 5 hyperechoic rounded nodule inferior to the right thyroid gland is possibly a parathyroid adenoma.  PTH labs x 2 were both within normal limits.  Review of calcium levels going back to 2014 were all normal.  He has no history of renal calculi or fragility fractures.  There is no palpable mass on physical examination.  He had a ENT consultation they recommended monitoring of laboratory studies and referral back to them if parathyroidectomy was indicated.  Recommend monitoring calcium and parathyroid hormone levels at least yearly, optimally every 6 months.  I discussed the pathophysiology of parathyroid disease, hyperparathyroidism and hypercalcemia with the patient, educated him on the possibility of renal calculi and osteopenia or osteoporosis, reviewed laboratory studies with him and the ultrasound findings

## 2024-06-06 ENCOUNTER — PATIENT MESSAGE (OUTPATIENT)
Dept: FAMILY MEDICINE CLINIC | Age: 44
End: 2024-06-06

## 2024-06-07 RX ORDER — PAROXETINE HYDROCHLORIDE 40 MG/1
40 TABLET, FILM COATED ORAL EVERY MORNING
Qty: 90 TABLET | Refills: 1 | Status: SHIPPED | OUTPATIENT
Start: 2024-06-07

## 2024-06-07 NOTE — TELEPHONE ENCOUNTER
Comments: Pt will be out over the weekend. He has an appointment with you next week.     Last Office Visit (last PCP visit):   2/12/2024    Next Visit Date:  Future Appointments   Date Time Provider Department Center   6/11/2024 11:15 AM Ozzie Ruelas MD [facility]  [facility]       **If hasn't been seen in over a year OR hasn't followed up according to last diabetes/ADHD visit, make appointment for patient before sending refill to provider.    Rx requested:  Requested Prescriptions     Pending Prescriptions Disp Refills    PARoxetine (PAXIL) 40 MG tablet 30 tablet 0     Sig: Take 1 tablet by mouth every morning

## 2024-06-07 NOTE — TELEPHONE ENCOUNTER
From: Duncan Tripathi  To: Dr. Ozzie Ruelas  Sent: 6/6/2024 6:29 PM EDT  Subject: Refill needed before my appointment     Good evening,  I am going to need a refill of my Paraxotine prescription before my appointment next week. I have doses for Friday and Saturday. If you are only prescribing me enough to get to my appointment, I would need 3 pills. Otherwise I’m comfortable with another 3 month prescription.  Thank you   Duncan Tripathi

## 2024-06-11 ENCOUNTER — OFFICE VISIT (OUTPATIENT)
Dept: FAMILY MEDICINE CLINIC | Age: 44
End: 2024-06-11
Payer: MEDICARE

## 2024-06-11 VITALS
SYSTOLIC BLOOD PRESSURE: 132 MMHG | WEIGHT: 224 LBS | OXYGEN SATURATION: 98 % | BODY MASS INDEX: 30.38 KG/M2 | HEART RATE: 77 BPM | DIASTOLIC BLOOD PRESSURE: 80 MMHG | TEMPERATURE: 97.4 F

## 2024-06-11 DIAGNOSIS — R26.9 ABNORMAL GAIT: Primary | ICD-10-CM

## 2024-06-11 DIAGNOSIS — M19.079: ICD-10-CM

## 2024-06-11 PROCEDURE — 99213 OFFICE O/P EST LOW 20 MIN: CPT | Performed by: FAMILY MEDICINE

## 2024-06-12 SDOH — HEALTH STABILITY: PHYSICAL HEALTH: ON AVERAGE, HOW MANY DAYS PER WEEK DO YOU ENGAGE IN MODERATE TO STRENUOUS EXERCISE (LIKE A BRISK WALK)?: 2 DAYS

## 2024-06-12 SDOH — HEALTH STABILITY: PHYSICAL HEALTH: ON AVERAGE, HOW MANY MINUTES DO YOU ENGAGE IN EXERCISE AT THIS LEVEL?: 20 MIN

## 2024-06-13 ENCOUNTER — HOSPITAL ENCOUNTER (OUTPATIENT)
Dept: GENERAL RADIOLOGY | Age: 44
Discharge: HOME | End: 2024-06-15
Payer: MEDICARE

## 2024-06-13 ENCOUNTER — OFFICE VISIT (OUTPATIENT)
Dept: ORTHOPEDIC SURGERY | Age: 44
End: 2024-06-13

## 2024-06-13 ENCOUNTER — HOSPITAL ENCOUNTER (OUTPATIENT)
Age: 44
Discharge: HOME | End: 2024-06-15
Payer: MEDICARE

## 2024-06-13 VITALS — BODY MASS INDEX: 30.34 KG/M2 | WEIGHT: 224 LBS | HEIGHT: 72 IN

## 2024-06-13 DIAGNOSIS — M25.571 RIGHT ANKLE PAIN, UNSPECIFIED CHRONICITY: ICD-10-CM

## 2024-06-13 DIAGNOSIS — M21.959 ACQUIRED LEG DEFORMITY: ICD-10-CM

## 2024-06-13 DIAGNOSIS — M25.571 RIGHT ANKLE PAIN, UNSPECIFIED CHRONICITY: Primary | ICD-10-CM

## 2024-06-13 PROCEDURE — 73610 X-RAY EXAM OF ANKLE: CPT

## 2024-06-13 ASSESSMENT — ENCOUNTER SYMPTOMS
EYES NEGATIVE: 1
RESPIRATORY NEGATIVE: 1
GASTROINTESTINAL NEGATIVE: 1
ALLERGIC/IMMUNOLOGIC NEGATIVE: 1

## 2024-06-13 NOTE — PROGRESS NOTES
Orthopedic Surgery and Sports Medicine    Subjective:      Patient ID: Duncan Tripathi is a 43 y.o. male who presents today for:  Chief Complaint   Patient presents with    New Patient     Pt presents today for Right Ankle Pain. Pt states that his ankle pain radiates to knee, foot is turning sideways and ankle is turning out. Pt takes naproxen when need leg and ankle is swollen. Pt doesn't use ice or heat. Pain doesn't effect sleep.        Duncan Tripathi was sent to me by Ozzie Ruelas for evaluation of the patient's right ankle pain.      HPI  Duncan is a 43-year-old male who presents today for evaluation of his right leg and ankle.  He presents today due to a rotational deformity in his right leg.  He states that he saw Dr. Mello for his right foot deformity.  However he also has a rotational deformity and she recommended treatment of that before addressing the foot.  He reports a history of surgery once on his right leg.  This was a meniscectomy of his right knee.  He has not had any other trauma to his right leg.  He is wearing bilateral ankle braces.  He states that he often stands with his right leg turned out and that is most comfortable.  He states that it is becoming more difficult for him to walk because his legs/ankle are painful and swollen.      Past Medical History:   Diagnosis Date    Anxiety     Bipolar disorder (HCC)     Depression     Hypertension     Lumbar degenerative disc disease 09/21/2017    Lumbar radicular pain       Past Surgical History:   Procedure Laterality Date    BACK SURGERY N/A 2014    DENTAL SURGERY  2010    all    KNEE ARTHROSCOPY  1995    rt knee    KNEE SURGERY       Social History     Socioeconomic History    Marital status: Single     Spouse name: Not on file    Number of children: Not on file    Years of education: Not on file    Highest education level: Not on file   Occupational History    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   Tobacco Use    Smoking status: Former     Current

## 2024-06-17 ASSESSMENT — ENCOUNTER SYMPTOMS
DIARRHEA: 0
BLOOD IN STOOL: 0
VOMITING: 0
SHORTNESS OF BREATH: 0
NAUSEA: 0
CONSTIPATION: 0
COUGH: 0
APNEA: 0
ABDOMINAL PAIN: 0
CHEST TIGHTNESS: 0

## 2024-06-18 NOTE — PROGRESS NOTES
for apnea, cough, chest tightness and shortness of breath.    Cardiovascular:  Negative for chest pain, palpitations and leg swelling.   Gastrointestinal:  Negative for abdominal pain, blood in stool, constipation, diarrhea, nausea and vomiting.   Musculoskeletal:  Positive for arthralgias and gait problem.   Neurological:  Negative for seizures and headaches.   Psychiatric/Behavioral:  Negative for hallucinations and suicidal ideas.        Objective:   /80   Pulse 77   Temp 97.4 °F (36.3 °C) (Infrared)   Wt 101.6 kg (224 lb)   SpO2 98%   BMI 30.38 kg/m²     Physical Exam  Vitals and nursing note reviewed.   Constitutional:       General: He is not in acute distress.     Appearance: Normal appearance. He is well-developed. He is not diaphoretic.   HENT:      Head: Normocephalic and atraumatic.      Nose: Nose normal.      Mouth/Throat:      Mouth: Mucous membranes are moist.      Pharynx: Oropharynx is clear.   Eyes:      Conjunctiva/sclera: Conjunctivae normal.      Pupils: Pupils are equal, round, and reactive to light.   Cardiovascular:      Rate and Rhythm: Normal rate and regular rhythm.      Heart sounds: Normal heart sounds. No murmur heard.     No friction rub. No gallop.   Pulmonary:      Effort: Pulmonary effort is normal. No respiratory distress.      Breath sounds: Normal breath sounds. No wheezing or rales.   Chest:      Chest wall: No tenderness.   Abdominal:      General: Abdomen is flat. Bowel sounds are normal.      Palpations: Abdomen is soft.      Tenderness: There is no abdominal tenderness.   Musculoskeletal:      Cervical back: Normal range of motion.        Legs:    Skin:     General: Skin is warm and dry.   Neurological:      Mental Status: He is alert and oriented to person, place, and time.   Psychiatric:         Behavior: Behavior normal.         Thought Content: Thought content normal.         Judgment: Judgment normal.         & Plan:     1. Abnormal gait  Will have patient

## 2024-06-23 ENCOUNTER — APPOINTMENT (OUTPATIENT)
Dept: CT IMAGING | Age: 44
End: 2024-06-23
Payer: MEDICARE

## 2024-06-23 ENCOUNTER — HOSPITAL ENCOUNTER (EMERGENCY)
Age: 44
Discharge: HOME | End: 2024-06-23
Payer: MEDICARE

## 2024-06-23 VITALS
RESPIRATION RATE: 18 BRPM | BODY MASS INDEX: 29.8 KG/M2 | HEART RATE: 77 BPM | TEMPERATURE: 98.7 F | OXYGEN SATURATION: 97 % | SYSTOLIC BLOOD PRESSURE: 137 MMHG | DIASTOLIC BLOOD PRESSURE: 93 MMHG | HEIGHT: 72 IN | WEIGHT: 220 LBS

## 2024-06-23 DIAGNOSIS — R10.84 GENERALIZED ABDOMINAL PAIN: Primary | ICD-10-CM

## 2024-06-23 LAB
ALBUMIN SERPL-MCNC: 4.7 G/DL (ref 3.5–4.6)
ALP SERPL-CCNC: 104 U/L (ref 35–104)
ALT SERPL-CCNC: 50 U/L (ref 0–41)
ANION GAP SERPL CALCULATED.3IONS-SCNC: 10 MEQ/L (ref 9–15)
AST SERPL-CCNC: 32 U/L (ref 0–40)
BASOPHILS # BLD: 0.1 K/UL (ref 0–0.1)
BASOPHILS NFR BLD: 1 % (ref 0.2–1.2)
BILIRUB SERPL-MCNC: 3.2 MG/DL (ref 0.2–0.7)
BUN SERPL-MCNC: 13 MG/DL (ref 6–20)
CALCIUM SERPL-MCNC: 9.3 MG/DL (ref 8.5–9.9)
CHLORIDE SERPL-SCNC: 102 MEQ/L (ref 95–107)
CO2 SERPL-SCNC: 29 MEQ/L (ref 20–31)
CREAT SERPL-MCNC: 0.67 MG/DL (ref 0.7–1.2)
EOSINOPHIL # BLD: 0.2 K/UL (ref 0–0.5)
EOSINOPHIL NFR BLD: 3.6 % (ref 0.8–7)
ERYTHROCYTE [DISTWIDTH] IN BLOOD BY AUTOMATED COUNT: 11.9 % (ref 11.6–14.4)
GLOBULIN SER CALC-MCNC: 2.8 G/DL (ref 2.3–3.5)
GLUCOSE SERPL-MCNC: 126 MG/DL (ref 70–99)
HCT VFR BLD AUTO: 51.9 % (ref 42–52)
HGB BLD-MCNC: 19.3 G/DL (ref 13.7–17.5)
IMM GRANULOCYTES # BLD: 0 K/UL
IMM GRANULOCYTES NFR BLD: 0.2 %
LIPASE SERPL-CCNC: 54 U/L (ref 12–95)
LYMPHOCYTES # BLD: 1.5 K/UL (ref 1.3–3.6)
LYMPHOCYTES NFR BLD: 29.4 %
MAGNESIUM SERPL-MCNC: 2.2 MG/DL (ref 1.7–2.4)
MCH RBC QN AUTO: 36.6 PG (ref 25.7–32.2)
MCHC RBC AUTO-ENTMCNC: 37.2 % (ref 32.3–36.5)
MCV RBC AUTO: 98.3 FL (ref 79–92.2)
MONOCYTES # BLD: 0.5 K/UL (ref 0.3–0.8)
MONOCYTES NFR BLD: 10.1 % (ref 5.3–12.2)
NEUTROPHILS # BLD: 2.8 K/UL (ref 1.8–5.4)
NEUTS SEG NFR BLD: 55.7 % (ref 34–67.9)
PLATELET # BLD AUTO: 253 K/UL (ref 163–337)
POTASSIUM SERPL-SCNC: 3.8 MEQ/L (ref 3.4–4.9)
PROT SERPL-MCNC: 7.5 G/DL (ref 6.3–8)
RBC # BLD AUTO: 5.28 M/UL (ref 4.63–6.08)
SODIUM SERPL-SCNC: 141 MEQ/L (ref 135–144)
WBC # BLD AUTO: 5 K/UL (ref 4.2–9)

## 2024-06-23 PROCEDURE — 74177 CT ABD & PELVIS W/CONTRAST: CPT

## 2024-06-23 PROCEDURE — 83735 ASSAY OF MAGNESIUM: CPT

## 2024-06-23 PROCEDURE — 96360 HYDRATION IV INFUSION INIT: CPT

## 2024-06-23 PROCEDURE — 6370000000 HC RX 637 (ALT 250 FOR IP): Performed by: NURSE PRACTITIONER

## 2024-06-23 PROCEDURE — 6360000004 HC RX CONTRAST MEDICATION: Performed by: NURSE PRACTITIONER

## 2024-06-23 PROCEDURE — 83690 ASSAY OF LIPASE: CPT

## 2024-06-23 PROCEDURE — 99285 EMERGENCY DEPT VISIT HI MDM: CPT

## 2024-06-23 PROCEDURE — 2580000003 HC RX 258: Performed by: NURSE PRACTITIONER

## 2024-06-23 PROCEDURE — 36415 COLL VENOUS BLD VENIPUNCTURE: CPT

## 2024-06-23 PROCEDURE — 85025 COMPLETE CBC W/AUTO DIFF WBC: CPT

## 2024-06-23 PROCEDURE — 80053 COMPREHEN METABOLIC PANEL: CPT

## 2024-06-23 RX ORDER — 0.9 % SODIUM CHLORIDE 0.9 %
1000 INTRAVENOUS SOLUTION INTRAVENOUS ONCE
Status: COMPLETED | OUTPATIENT
Start: 2024-06-23 | End: 2024-06-23

## 2024-06-23 RX ORDER — DICYCLOMINE HYDROCHLORIDE 10 MG/1
10 CAPSULE ORAL ONCE
Status: COMPLETED | OUTPATIENT
Start: 2024-06-23 | End: 2024-06-23

## 2024-06-23 RX ORDER — DICYCLOMINE HYDROCHLORIDE 10 MG/1
10 CAPSULE ORAL EVERY 6 HOURS PRN
Qty: 20 CAPSULE | Refills: 0 | Status: SHIPPED | OUTPATIENT
Start: 2024-06-23 | End: 2024-06-28

## 2024-06-23 RX ORDER — ONDANSETRON 4 MG/1
4 TABLET, ORALLY DISINTEGRATING ORAL EVERY 8 HOURS PRN
Qty: 20 TABLET | Refills: 0 | Status: SHIPPED | OUTPATIENT
Start: 2024-06-23 | End: 2024-06-30

## 2024-06-23 RX ADMIN — DICYCLOMINE HYDROCHLORIDE 10 MG: 10 CAPSULE ORAL at 11:53

## 2024-06-23 RX ADMIN — IOPAMIDOL 75 ML: 755 INJECTION, SOLUTION INTRAVENOUS at 12:30

## 2024-06-23 RX ADMIN — SODIUM CHLORIDE 1000 ML: 9 INJECTION, SOLUTION INTRAVENOUS at 11:53

## 2024-06-23 ASSESSMENT — PAIN DESCRIPTION - FREQUENCY: FREQUENCY: CONTINUOUS

## 2024-06-23 ASSESSMENT — LIFESTYLE VARIABLES
HOW OFTEN DO YOU HAVE A DRINK CONTAINING ALCOHOL: NEVER
HOW MANY STANDARD DRINKS CONTAINING ALCOHOL DO YOU HAVE ON A TYPICAL DAY: PATIENT DOES NOT DRINK

## 2024-06-23 ASSESSMENT — PAIN SCALES - GENERAL: PAINLEVEL_OUTOF10: 3

## 2024-06-23 ASSESSMENT — PAIN DESCRIPTION - PAIN TYPE: TYPE: ACUTE PAIN

## 2024-06-23 ASSESSMENT — PAIN DESCRIPTION - ORIENTATION: ORIENTATION: MID

## 2024-06-23 ASSESSMENT — PAIN DESCRIPTION - DESCRIPTORS: DESCRIPTORS: PATIENT UNABLE TO DESCRIBE

## 2024-06-23 ASSESSMENT — PAIN - FUNCTIONAL ASSESSMENT: PAIN_FUNCTIONAL_ASSESSMENT: 0-10

## 2024-06-23 ASSESSMENT — PAIN DESCRIPTION - LOCATION: LOCATION: ABDOMEN

## 2024-06-23 NOTE — ED NOTES
Pt reports feeling better after meds. Skin pink w/d resp non labored. Aware of wait for ct results. Pt refuses to attempt to urinate.

## 2024-06-23 NOTE — ED PROVIDER NOTES
Scale   (!) 155/122 98.7 °F (37.1 °C) Oral 77 18 97 % 1.829 m (6') 99.8 kg (220 lb)       Physical Exam  Vitals and nursing note reviewed.   Constitutional:       General: He is not in acute distress.     Appearance: Normal appearance. He is well-developed. He is obese. He is not ill-appearing, toxic-appearing or diaphoretic.   HENT:      Head: Normocephalic and atraumatic.      Right Ear: Tympanic membrane, ear canal and external ear normal.      Left Ear: Tympanic membrane, ear canal and external ear normal.      Nose: Nose normal. No congestion or rhinorrhea.      Mouth/Throat:      Mouth: Mucous membranes are moist.      Pharynx: Oropharynx is clear. No oropharyngeal exudate or posterior oropharyngeal erythema.   Eyes:      Extraocular Movements: Extraocular movements intact.      Conjunctiva/sclera: Conjunctivae normal.      Pupils: Pupils are equal, round, and reactive to light.   Cardiovascular:      Rate and Rhythm: Normal rate and regular rhythm.      Pulses: Normal pulses.      Heart sounds: Normal heart sounds.   Pulmonary:      Effort: Pulmonary effort is normal. No respiratory distress.      Breath sounds: Normal breath sounds.   Abdominal:      General: Bowel sounds are normal.      Palpations: Abdomen is soft.      Tenderness: There is generalized abdominal tenderness. There is no right CVA tenderness, left CVA tenderness, guarding or rebound. Negative signs include Simms's sign, Rovsing's sign, McBurney's sign, psoas sign and obturator sign.   Musculoskeletal:         General: Normal range of motion.      Cervical back: Normal range of motion and neck supple. No tenderness.   Skin:     General: Skin is warm and dry.      Capillary Refill: Capillary refill takes less than 2 seconds.      Findings: No rash.   Neurological:      General: No focal deficit present.      Mental Status: He is alert and oriented to person, place, and time.      Cranial Nerves: No cranial nerve deficit.      Sensory: No

## 2024-06-23 NOTE — ED TRIAGE NOTES
Pt a/o x 3 skin pink w/d resp non labored. Pt reports abd discomfort \"fullness\"x 5 days and no bm x 2. Pt denies n,v. Pt also c/o decreased energy.

## 2024-07-11 ENCOUNTER — HOSPITAL ENCOUNTER (OUTPATIENT)
Dept: LAB | Age: 44
Discharge: HOME | End: 2024-07-11
Payer: MEDICARE

## 2024-07-11 ENCOUNTER — OFFICE VISIT (OUTPATIENT)
Dept: FAMILY MEDICINE CLINIC | Age: 44
End: 2024-07-11
Payer: MEDICARE

## 2024-07-11 VITALS
OXYGEN SATURATION: 99 % | TEMPERATURE: 97 F | DIASTOLIC BLOOD PRESSURE: 80 MMHG | BODY MASS INDEX: 30.79 KG/M2 | WEIGHT: 227 LBS | SYSTOLIC BLOOD PRESSURE: 130 MMHG | HEART RATE: 66 BPM

## 2024-07-11 DIAGNOSIS — R79.89 ELEVATED LFTS: ICD-10-CM

## 2024-07-11 DIAGNOSIS — D75.1 POLYCYTHEMIA: ICD-10-CM

## 2024-07-11 DIAGNOSIS — R10.84 GENERALIZED ABDOMINAL PAIN: ICD-10-CM

## 2024-07-11 DIAGNOSIS — D75.1 POLYCYTHEMIA: Primary | ICD-10-CM

## 2024-07-11 DIAGNOSIS — F90.9 ATTENTION DEFICIT HYPERACTIVITY DISORDER (ADHD), UNSPECIFIED ADHD TYPE: ICD-10-CM

## 2024-07-11 LAB
ALBUMIN SERPL-MCNC: 4.2 G/DL (ref 3.5–4.6)
ALP SERPL-CCNC: 108 U/L (ref 35–104)
ALT SERPL-CCNC: 39 U/L (ref 0–41)
ANION GAP SERPL CALCULATED.3IONS-SCNC: 10 MEQ/L (ref 9–15)
AST SERPL-CCNC: 21 U/L (ref 0–40)
BASOPHILS # BLD: 0 K/UL (ref 0–0.2)
BASOPHILS NFR BLD: 0.8 %
BILIRUB SERPL-MCNC: 0.9 MG/DL (ref 0.2–0.7)
BUN SERPL-MCNC: 10 MG/DL (ref 6–20)
CALCIUM SERPL-MCNC: 9.1 MG/DL (ref 8.5–9.9)
CHLORIDE SERPL-SCNC: 102 MEQ/L (ref 95–107)
CO2 SERPL-SCNC: 28 MEQ/L (ref 20–31)
CREAT SERPL-MCNC: 0.72 MG/DL (ref 0.7–1.2)
EOSINOPHIL # BLD: 0.1 K/UL (ref 0–0.7)
EOSINOPHIL NFR BLD: 2.8 %
ERYTHROCYTE [DISTWIDTH] IN BLOOD BY AUTOMATED COUNT: 12.7 % (ref 11.5–14.5)
GLOBULIN SER CALC-MCNC: 2.1 G/DL (ref 2.3–3.5)
GLUCOSE SERPL-MCNC: 82 MG/DL (ref 70–99)
HCT VFR BLD AUTO: 42.3 % (ref 42–52)
HGB BLD-MCNC: 15.2 G/DL (ref 14–18)
LYMPHOCYTES # BLD: 1.3 K/UL (ref 1–4.8)
LYMPHOCYTES NFR BLD: 25.4 %
MCH RBC QN AUTO: 36 PG (ref 27–31.3)
MCHC RBC AUTO-ENTMCNC: 35.9 % (ref 33–37)
MCV RBC AUTO: 100.2 FL (ref 79–92.2)
MONOCYTES # BLD: 0.5 K/UL (ref 0.2–0.8)
MONOCYTES NFR BLD: 10.4 %
NEUTROPHILS # BLD: 3 K/UL (ref 1.4–6.5)
NEUTS SEG NFR BLD: 60.2 %
PLATELET # BLD AUTO: 229 K/UL (ref 130–400)
POTASSIUM SERPL-SCNC: 3.6 MEQ/L (ref 3.4–4.9)
PROT SERPL-MCNC: 6.3 G/DL (ref 6.3–8)
RBC # BLD AUTO: 4.22 M/UL (ref 4.7–6.1)
SODIUM SERPL-SCNC: 140 MEQ/L (ref 135–144)
WBC # BLD AUTO: 4.9 K/UL (ref 4.8–10.8)

## 2024-07-11 PROCEDURE — 80053 COMPREHEN METABOLIC PANEL: CPT

## 2024-07-11 PROCEDURE — 85025 COMPLETE CBC W/AUTO DIFF WBC: CPT

## 2024-07-11 PROCEDURE — 99214 OFFICE O/P EST MOD 30 MIN: CPT | Performed by: FAMILY MEDICINE

## 2024-07-11 PROCEDURE — 36415 COLL VENOUS BLD VENIPUNCTURE: CPT

## 2024-07-11 RX ORDER — ATOMOXETINE 40 MG/1
40 CAPSULE ORAL DAILY
Qty: 90 CAPSULE | Refills: 1 | Status: SHIPPED | OUTPATIENT
Start: 2024-07-11

## 2024-07-11 NOTE — PROGRESS NOTES
Subjective:      Patient ID: Duncan Tripathi is a 43 y.o. male who presents today for:  Chief Complaint   Patient presents with    ED Follow-up     Abd pain/ improved less frequent, using bentyl for spasms, has appt tomorrow for R leg pain with [facility]       HPI  Duncan Tripathi is a very pleasant 43-year-old male presents today to follow-up and Stefanie is being seen in the emergency room for abdominal pain.  He states that majority of his abdominal pain has improved.  He is currently still using Bentyl which has been helpful for spasms.  Upon review, laboratory work showed polycythemia and elevation in LFTs.    ADHD: Stable: Patient believes medication remains effective    Past Medical History:   Diagnosis Date    Anxiety     Bipolar disorder (HCC)     Chronic back pain 2014    Depression     Hypertension     Lumbar degenerative disc disease 09/21/2017    Lumbar radicular pain     Neuropathy      Past Surgical History:   Procedure Laterality Date    BACK SURGERY N/A 2014    DENTAL SURGERY  2010    all    KNEE ARTHROSCOPY  1995    rt knee    KNEE SURGERY       Family History   Problem Relation Age of Onset    Arthritis Mother     High Blood Pressure Mother     Diabetes Father     High Blood Pressure Father     Cancer Father         Pancreatic cancer    Arthritis Father     High Cholesterol Father     Colon Cancer Maternal Aunt     Mental Illness Paternal Aunt      Social History     Socioeconomic History    Marital status: Single     Spouse name: Not on file    Number of children: Not on file    Years of education: Not on file    Highest education level: Not on file   Occupational History    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   Tobacco Use    Smoking status: Former     Current packs/day: 0.00     Types: Cigarettes     Start date: 1/23/2012     Quit date: 2021     Years since quitting: 3.5     Passive exposure: Past    Smokeless tobacco: Never    Tobacco comments:     smokes marijuana every day   Vaping Use    Vaping Use: Never 
No

## 2024-07-18 ASSESSMENT — ENCOUNTER SYMPTOMS
ABDOMINAL PAIN: 1
BLOOD IN STOOL: 0
DIARRHEA: 0
APNEA: 0
CHEST TIGHTNESS: 0
CONSTIPATION: 0
VOMITING: 0
COUGH: 0
NAUSEA: 0
SHORTNESS OF BREATH: 0

## 2024-12-03 ENCOUNTER — PATIENT MESSAGE (OUTPATIENT)
Age: 44
End: 2024-12-03

## 2024-12-03 RX ORDER — PAROXETINE 40 MG/1
40 TABLET, FILM COATED ORAL EVERY MORNING
Qty: 90 TABLET | Refills: 1 | Status: SHIPPED | OUTPATIENT
Start: 2024-12-03

## 2025-01-04 SDOH — ECONOMIC STABILITY: FOOD INSECURITY: WITHIN THE PAST 12 MONTHS, THE FOOD YOU BOUGHT JUST DIDN'T LAST AND YOU DIDN'T HAVE MONEY TO GET MORE.: NEVER TRUE

## 2025-01-04 SDOH — ECONOMIC STABILITY: FOOD INSECURITY: WITHIN THE PAST 12 MONTHS, YOU WORRIED THAT YOUR FOOD WOULD RUN OUT BEFORE YOU GOT MONEY TO BUY MORE.: NEVER TRUE

## 2025-01-04 SDOH — HEALTH STABILITY: PHYSICAL HEALTH: ON AVERAGE, HOW MANY DAYS PER WEEK DO YOU ENGAGE IN MODERATE TO STRENUOUS EXERCISE (LIKE A BRISK WALK)?: 5 DAYS

## 2025-01-04 SDOH — ECONOMIC STABILITY: INCOME INSECURITY: HOW HARD IS IT FOR YOU TO PAY FOR THE VERY BASICS LIKE FOOD, HOUSING, MEDICAL CARE, AND HEATING?: NOT HARD AT ALL

## 2025-01-04 SDOH — HEALTH STABILITY: PHYSICAL HEALTH: ON AVERAGE, HOW MANY MINUTES DO YOU ENGAGE IN EXERCISE AT THIS LEVEL?: 20 MIN

## 2025-01-04 ASSESSMENT — PATIENT HEALTH QUESTIONNAIRE - PHQ9
SUM OF ALL RESPONSES TO PHQ QUESTIONS 1-9: 1
2. FEELING DOWN, DEPRESSED OR HOPELESS: SEVERAL DAYS
SUM OF ALL RESPONSES TO PHQ9 QUESTIONS 1 & 2: 1
SUM OF ALL RESPONSES TO PHQ QUESTIONS 1-9: 1
1. LITTLE INTEREST OR PLEASURE IN DOING THINGS: NOT AT ALL

## 2025-01-04 ASSESSMENT — LIFESTYLE VARIABLES
HOW MANY STANDARD DRINKS CONTAINING ALCOHOL DO YOU HAVE ON A TYPICAL DAY: 1
HOW OFTEN DO YOU HAVE A DRINK CONTAINING ALCOHOL: 2
HOW MANY STANDARD DRINKS CONTAINING ALCOHOL DO YOU HAVE ON A TYPICAL DAY: 1 OR 2
HOW OFTEN DO YOU HAVE A DRINK CONTAINING ALCOHOL: MONTHLY OR LESS
HOW OFTEN DO YOU HAVE SIX OR MORE DRINKS ON ONE OCCASION: 1

## 2025-01-07 ENCOUNTER — OFFICE VISIT (OUTPATIENT)
Age: 45
End: 2025-01-07
Payer: MEDICARE

## 2025-01-07 VITALS
SYSTOLIC BLOOD PRESSURE: 120 MMHG | OXYGEN SATURATION: 99 % | WEIGHT: 226 LBS | TEMPERATURE: 97.5 F | BODY MASS INDEX: 30.65 KG/M2 | DIASTOLIC BLOOD PRESSURE: 86 MMHG | HEART RATE: 74 BPM

## 2025-01-07 DIAGNOSIS — F90.9 ATTENTION DEFICIT HYPERACTIVITY DISORDER (ADHD), UNSPECIFIED ADHD TYPE: ICD-10-CM

## 2025-01-07 DIAGNOSIS — Z00.00 ENCOUNTER FOR ANNUAL WELLNESS VISIT (AWV) IN MEDICARE PATIENT: Primary | ICD-10-CM

## 2025-01-07 DIAGNOSIS — Z00.00 INITIAL MEDICARE ANNUAL WELLNESS VISIT: ICD-10-CM

## 2025-01-07 DIAGNOSIS — D35.1 PARATHYROID ADENOMA: ICD-10-CM

## 2025-01-07 DIAGNOSIS — M77.12 LATERAL EPICONDYLITIS OF LEFT ELBOW: ICD-10-CM

## 2025-01-07 PROCEDURE — G0438 PPPS, INITIAL VISIT: HCPCS | Performed by: FAMILY MEDICINE

## 2025-01-07 RX ORDER — ATOMOXETINE 40 MG/1
40 CAPSULE ORAL DAILY
Qty: 90 CAPSULE | Refills: 1 | Status: SHIPPED | OUTPATIENT
Start: 2025-01-07

## 2025-01-07 NOTE — PROGRESS NOTES
MG capsule Take 1 capsule by mouth daily Yes Ozzie Ruelas MD   Elastic Bandages & Supports (NEOPRENE TENNIS ELBOW SLEEVE) MISC 1 each by Does not apply route daily Yes Ozzie Ruelas MD   PARoxetine (PAXIL) 40 MG tablet Take 1 tablet by mouth every morning Yes Ozzie Ruelas MD   vitamin D3 (CHOLECALCIFEROL) 125 MCG (5000 UT) TABS tablet Take 1 tablet by mouth daily Yes Provider, MD Ethan   B Complex-C (SUPER B COMPLEX PO)  Yes Provider, MD Ethan       CareTeam (Including outside providers/suppliers regularly involved in providing care):   Patient Care Team:  Ozzie Ruelas MD as PCP - General (Family Medicine)  Ozzie Ruelas MD as PCP - Empaneled Provider     Recommendations for Preventive Services Due: see orders and patient instructions/AVS.  Recommended screening schedule for the next 5-10 years is provided to the patient in written form: see Patient Instructions/AVS.     Reviewed and updated this visit:  Allergies  Meds

## 2025-01-07 NOTE — PATIENT INSTRUCTIONS
chest.     Sweating.     Shortness of breath.     Pain, pressure, or a strange feeling in the back, neck, jaw, or upper belly or in one or both shoulders or arms.     Lightheadedness or sudden weakness.     A fast or irregular heartbeat.   After you call 911, the  may tell you to chew 1 adult-strength or 2 to 4 low-dose aspirin. Wait for an ambulance. Do not try to drive yourself.  Watch closely for changes in your health, and be sure to contact your doctor if you have any problems.  Where can you learn more?  Go to https://[email or web address].net/patientEd and enter F075 to learn more about \"A Healthy Heart: Care Instructions.\"  Current as of: June 24, 2023  Content Version: 14.2  © 2024 [employer].   Care instructions adapted under license by [employer]. If you have questions about a medical condition or this instruction, always ask your healthcare professional. Healthwise, [employer] disclaims any warranty or liability for your use of this information.      Personalized Preventive Plan for Duncan Tripathi - 1/7/2025  Medicare offers a range of preventive health benefits. Some of the tests and screenings are paid in full while other may be subject to a deductible, co-insurance, and/or copay.  Some of these benefits include a comprehensive review of your medical history including lifestyle, illnesses that may run in your family, and various assessments and screenings as appropriate.  After reviewing your medical record and screening and assessments performed today your provider may have ordered immunizations, labs, imaging, and/or referrals for you.  A list of these orders (if applicable) as well as your Preventive Care list are included within your After Visit Summary for your review.

## 2025-02-23 ENCOUNTER — HOSPITAL ENCOUNTER (EMERGENCY)
Age: 45
Discharge: HOME | End: 2025-02-23
Payer: MEDICARE

## 2025-02-23 VITALS
WEIGHT: 220 LBS | DIASTOLIC BLOOD PRESSURE: 88 MMHG | SYSTOLIC BLOOD PRESSURE: 162 MMHG | HEART RATE: 94 BPM | OXYGEN SATURATION: 99 % | BODY MASS INDEX: 29.8 KG/M2 | HEIGHT: 72 IN | RESPIRATION RATE: 20 BRPM | TEMPERATURE: 97.9 F

## 2025-02-23 DIAGNOSIS — B34.9 VIRAL ILLNESS: Primary | ICD-10-CM

## 2025-02-23 DIAGNOSIS — R11.0 NAUSEA: ICD-10-CM

## 2025-02-23 LAB
ALBUMIN SERPL-MCNC: 4.7 G/DL (ref 3.5–4.6)
ALP SERPL-CCNC: 129 U/L (ref 35–104)
ALT SERPL-CCNC: 33 U/L (ref 0–41)
AMYLASE SERPL-CCNC: 44 U/L (ref 22–93)
ANION GAP SERPL CALCULATED.3IONS-SCNC: 13 MEQ/L (ref 9–15)
AST SERPL-CCNC: 23 U/L (ref 0–40)
BASOPHILS # BLD: 0 K/UL (ref 0–0.1)
BASOPHILS NFR BLD: 0.5 % (ref 0.2–1.2)
BILIRUB SERPL-MCNC: 3.2 MG/DL (ref 0.2–0.7)
BUN SERPL-MCNC: 10 MG/DL (ref 6–20)
CALCIUM SERPL-MCNC: 9.5 MG/DL (ref 8.5–9.9)
CHLORIDE SERPL-SCNC: 102 MEQ/L (ref 95–107)
CO2 SERPL-SCNC: 24 MEQ/L (ref 20–31)
CREAT SERPL-MCNC: 0.89 MG/DL (ref 0.7–1.2)
EOSINOPHIL # BLD: 0 K/UL (ref 0–0.5)
EOSINOPHIL NFR BLD: 0.7 % (ref 0.8–7)
ERYTHROCYTE [DISTWIDTH] IN BLOOD BY AUTOMATED COUNT: 12.3 % (ref 11.6–14.4)
ETHANOL PERCENT: NORMAL G/DL
ETHANOLAMINE SERPL-MCNC: <10 MG/DL (ref 0–0.08)
GLOBULIN SER CALC-MCNC: 2.5 G/DL (ref 2.3–3.5)
GLUCOSE SERPL-MCNC: 131 MG/DL (ref 70–99)
HCT VFR BLD AUTO: 49.9 % (ref 42–52)
HGB BLD-MCNC: 18.4 G/DL (ref 13.7–17.5)
IMM GRANULOCYTES # BLD: 0 K/UL
IMM GRANULOCYTES NFR BLD: 0.2 %
INFLUENZA A BY PCR: NEGATIVE
INFLUENZA B BY PCR: NEGATIVE
LIPASE SERPL-CCNC: 25 U/L (ref 12–95)
LYMPHOCYTES # BLD: 1.1 K/UL (ref 1.3–3.6)
LYMPHOCYTES NFR BLD: 19.1 %
MAGNESIUM SERPL-MCNC: 1.9 MG/DL (ref 1.7–2.4)
MCH RBC QN AUTO: 36.1 PG (ref 25.7–32.2)
MCHC RBC AUTO-ENTMCNC: 36.9 % (ref 32.3–36.5)
MCV RBC AUTO: 97.8 FL (ref 79–92.2)
MONOCYTES # BLD: 0.5 K/UL (ref 0.3–0.8)
MONOCYTES NFR BLD: 9.3 % (ref 5.3–12.2)
NEUTROPHILS # BLD: 3.9 K/UL (ref 1.8–5.4)
NEUTS SEG NFR BLD: 70.2 % (ref 34–67.9)
PLATELET # BLD AUTO: 272 K/UL (ref 163–337)
POTASSIUM SERPL-SCNC: 3.3 MEQ/L (ref 3.4–4.9)
PROT SERPL-MCNC: 7.2 G/DL (ref 6.3–8)
RBC # BLD AUTO: 5.1 M/UL (ref 4.63–6.08)
SARS-COV-2 RDRP RESP QL NAA+PROBE: NOT DETECTED
SODIUM SERPL-SCNC: 139 MEQ/L (ref 135–144)
WBC # BLD AUTO: 5.6 K/UL (ref 4.2–9)

## 2025-02-23 PROCEDURE — 96374 THER/PROPH/DIAG INJ IV PUSH: CPT

## 2025-02-23 PROCEDURE — 36415 COLL VENOUS BLD VENIPUNCTURE: CPT

## 2025-02-23 PROCEDURE — 83735 ASSAY OF MAGNESIUM: CPT

## 2025-02-23 PROCEDURE — 82077 ASSAY SPEC XCP UR&BREATH IA: CPT

## 2025-02-23 PROCEDURE — 6360000002 HC RX W HCPCS

## 2025-02-23 PROCEDURE — 87502 INFLUENZA DNA AMP PROBE: CPT

## 2025-02-23 PROCEDURE — 85025 COMPLETE CBC W/AUTO DIFF WBC: CPT

## 2025-02-23 PROCEDURE — 87635 SARS-COV-2 COVID-19 AMP PRB: CPT

## 2025-02-23 PROCEDURE — 6370000000 HC RX 637 (ALT 250 FOR IP)

## 2025-02-23 PROCEDURE — 80053 COMPREHEN METABOLIC PANEL: CPT

## 2025-02-23 PROCEDURE — 82150 ASSAY OF AMYLASE: CPT

## 2025-02-23 PROCEDURE — 83690 ASSAY OF LIPASE: CPT

## 2025-02-23 PROCEDURE — 99284 EMERGENCY DEPT VISIT MOD MDM: CPT

## 2025-02-23 PROCEDURE — 2580000003 HC RX 258

## 2025-02-23 RX ORDER — ONDANSETRON 4 MG/1
4 TABLET, ORALLY DISINTEGRATING ORAL EVERY 8 HOURS PRN
Qty: 10 TABLET | Refills: 0 | Status: SHIPPED | OUTPATIENT
Start: 2025-02-23

## 2025-02-23 RX ORDER — POTASSIUM CHLORIDE 1500 MG/1
20 TABLET, EXTENDED RELEASE ORAL ONCE
Status: COMPLETED | OUTPATIENT
Start: 2025-02-23 | End: 2025-02-23

## 2025-02-23 RX ORDER — ONDANSETRON 2 MG/ML
4 INJECTION INTRAMUSCULAR; INTRAVENOUS ONCE
Status: COMPLETED | OUTPATIENT
Start: 2025-02-23 | End: 2025-02-23

## 2025-02-23 RX ORDER — 0.9 % SODIUM CHLORIDE 0.9 %
1000 INTRAVENOUS SOLUTION INTRAVENOUS ONCE
Status: COMPLETED | OUTPATIENT
Start: 2025-02-23 | End: 2025-02-23

## 2025-02-23 RX ADMIN — SODIUM CHLORIDE 1000 ML: 0.9 INJECTION, SOLUTION INTRAVENOUS at 18:57

## 2025-02-23 RX ADMIN — ONDANSETRON 4 MG: 2 INJECTION, SOLUTION INTRAMUSCULAR; INTRAVENOUS at 18:57

## 2025-02-23 RX ADMIN — POTASSIUM CHLORIDE 20 MEQ: 1500 TABLET, EXTENDED RELEASE ORAL at 20:00

## 2025-02-23 ASSESSMENT — PAIN - FUNCTIONAL ASSESSMENT
PAIN_FUNCTIONAL_ASSESSMENT: 0-10
PAIN_FUNCTIONAL_ASSESSMENT: NONE - DENIES PAIN

## 2025-02-23 ASSESSMENT — PAIN DESCRIPTION - ORIENTATION: ORIENTATION: MID

## 2025-02-23 ASSESSMENT — PAIN DESCRIPTION - PAIN TYPE: TYPE: ACUTE PAIN

## 2025-02-23 ASSESSMENT — PAIN SCALES - GENERAL: PAINLEVEL_OUTOF10: 4

## 2025-02-23 ASSESSMENT — LIFESTYLE VARIABLES
HOW MANY STANDARD DRINKS CONTAINING ALCOHOL DO YOU HAVE ON A TYPICAL DAY: PATIENT DOES NOT DRINK
HOW OFTEN DO YOU HAVE A DRINK CONTAINING ALCOHOL: MONTHLY OR LESS

## 2025-02-23 ASSESSMENT — PAIN DESCRIPTION - DESCRIPTORS: DESCRIPTORS: DULL

## 2025-02-23 ASSESSMENT — PAIN DESCRIPTION - FREQUENCY: FREQUENCY: CONTINUOUS

## 2025-02-23 ASSESSMENT — PAIN DESCRIPTION - LOCATION: LOCATION: ABDOMEN

## 2025-02-23 NOTE — ED PROVIDER NOTES
[facility] EMERGENCY DEPARTMENT  eMERGENCYdEPARTMENT eNCOUnter      Pt Name: Duncan Tripathi  MRN: [id number]  Birthdate 1980of evaluation: 2/23/2025  Provider:LUCIANA Hopkins CNP    CHIEF COMPLAINT       Chief Complaint   Patient presents with    Delirium Tremens (DTS)     Pt was given bad news a few days ago and has not drank in fifteen year and has drank 750 ml in 3 days now has shakes. And hasn't eaten minimally in past few days. Pt denies SI or HI         HISTORY OF PRESENT ILLNESS  (Location/Symptom, Timing/Onset, Context/Setting, Quality, Duration, Modifying Factors, Severity.)   Duncan Tripathi is a 44 y.o. male history of hypertension, anxiety, depression, bipolar disorder, chronic back pain, neuropathy, legally blind, who presents to the emergency department fatigue, nausea, myalgias.  Patient states he recently got out of a long distance relationship on Monday.  He states he has not drank alcohol in 15 years.  Monday through Wednesday over the course of 3 days he drank a 750 mL bottle of whiskey.  He states he did not feel good on Thursday through the weekend with complaints of nausea decreased oral intake, tremors, fatigue, generalized bodyaches.  He also has had some generalized shakiness since drinking the alcohol.  He states he has consumed very little food other than a granola bar and a little bit of green tea.  He is concerned for dehydration.  He denies need for involvement of any lets get real services as he states he does not plan or want to drink alcohol again.  He denies any suicidal or homicidal ideations.  He denies any chest pain, shortness of breath, abdominal pain, emesis, diarrhea, fever, chills, headache.    HPI    Nursing Notes were reviewed and I agree.    REVIEW OF SYSTEMS    (2-9 systems for level 4, 10 or more for level 5)     Review of Systems   Constitutional:  Positive for fatigue. Negative for activity change, chills and fever.   HENT:  Negative for ear pain and sore

## 2025-02-23 NOTE — ED TRIAGE NOTES
Patient arrives for c/o relapsing with ETOH. Patient states he has not drank in approx 15 years. Patient states he drank a total  750ml of alcohol over 3 days(Monday, Tuesday and Wednesday.) Patient states since he has been shaking, fatigued.Patient arrives a/o x3, appropriate to age.

## 2025-02-24 NOTE — DISCHARGE INSTRUCTIONS
Increase potassium in diet.  Increase oral hydration.  Refrain from EtOH use.  Follow-up with PCP.  Return to emergency department for any new or worsening symptoms.

## 2025-02-25 SDOH — ECONOMIC STABILITY: FOOD INSECURITY: WITHIN THE PAST 12 MONTHS, THE FOOD YOU BOUGHT JUST DIDN'T LAST AND YOU DIDN'T HAVE MONEY TO GET MORE.: NEVER TRUE

## 2025-02-25 SDOH — ECONOMIC STABILITY: INCOME INSECURITY: IN THE LAST 12 MONTHS, WAS THERE A TIME WHEN YOU WERE NOT ABLE TO PAY THE MORTGAGE OR RENT ON TIME?: NO

## 2025-02-25 SDOH — ECONOMIC STABILITY: FOOD INSECURITY: WITHIN THE PAST 12 MONTHS, YOU WORRIED THAT YOUR FOOD WOULD RUN OUT BEFORE YOU GOT MONEY TO BUY MORE.: NEVER TRUE

## 2025-02-27 ENCOUNTER — HOSPITAL ENCOUNTER (EMERGENCY)
Age: 45
Discharge: HOME | End: 2025-02-27
Attending: STUDENT IN AN ORGANIZED HEALTH CARE EDUCATION/TRAINING PROGRAM
Payer: MEDICARE

## 2025-02-27 ENCOUNTER — APPOINTMENT (OUTPATIENT)
Dept: GENERAL RADIOLOGY | Age: 45
End: 2025-02-27
Payer: MEDICARE

## 2025-02-27 ENCOUNTER — OFFICE VISIT (OUTPATIENT)
Age: 45
End: 2025-02-27
Payer: MEDICARE

## 2025-02-27 ENCOUNTER — TELEPHONE (OUTPATIENT)
Age: 45
End: 2025-02-27

## 2025-02-27 VITALS
TEMPERATURE: 97.8 F | SYSTOLIC BLOOD PRESSURE: 118 MMHG | WEIGHT: 206 LBS | HEART RATE: 95 BPM | OXYGEN SATURATION: 98 % | DIASTOLIC BLOOD PRESSURE: 80 MMHG | BODY MASS INDEX: 27.94 KG/M2

## 2025-02-27 VITALS
HEART RATE: 89 BPM | DIASTOLIC BLOOD PRESSURE: 94 MMHG | SYSTOLIC BLOOD PRESSURE: 119 MMHG | WEIGHT: 205 LBS | BODY MASS INDEX: 27.77 KG/M2 | OXYGEN SATURATION: 99 % | TEMPERATURE: 97.9 F | HEIGHT: 72 IN | RESPIRATION RATE: 21 BRPM

## 2025-02-27 DIAGNOSIS — F43.29 ADJUSTMENT DISORDER WITH DISTURBANCE OF EMOTION: ICD-10-CM

## 2025-02-27 DIAGNOSIS — K29.20 ACUTE ALCOHOLIC GASTRITIS WITHOUT HEMORRHAGE: ICD-10-CM

## 2025-02-27 DIAGNOSIS — R07.9 CHEST PAIN, UNSPECIFIED TYPE: Primary | ICD-10-CM

## 2025-02-27 DIAGNOSIS — R07.89 ATYPICAL CHEST PAIN: Primary | ICD-10-CM

## 2025-02-27 DIAGNOSIS — R11.0 NAUSEA: ICD-10-CM

## 2025-02-27 DIAGNOSIS — R20.2 PARESTHESIAS: ICD-10-CM

## 2025-02-27 DIAGNOSIS — F41.1 ANXIETY STATE: ICD-10-CM

## 2025-02-27 DIAGNOSIS — F32.A DEPRESSION, UNSPECIFIED DEPRESSION TYPE: ICD-10-CM

## 2025-02-27 DIAGNOSIS — R06.09 DYSPNEA ON EXERTION: ICD-10-CM

## 2025-02-27 LAB
ALBUMIN SERPL-MCNC: 4.4 G/DL (ref 3.5–4.6)
ALP SERPL-CCNC: 114 U/L (ref 35–104)
ALT SERPL-CCNC: 35 U/L (ref 0–41)
AMPHETAMINES UR QL SCN>500 NG/ML: ABNORMAL
ANION GAP SERPL CALCULATED.3IONS-SCNC: 9 MEQ/L (ref 9–15)
APAP SERPL-MCNC: <5 UG/ML (ref 10–30)
AST SERPL-CCNC: 22 U/L (ref 0–40)
BACTERIA URNS QL MICRO: ABNORMAL /HPF
BARBITURATES UR QL SCN>200 NG/ML: ABNORMAL
BASOPHILS # BLD: 0 K/UL (ref 0–0.1)
BASOPHILS NFR BLD: 0.4 % (ref 0.2–1.2)
BENZODIAZ UR QL SCN: ABNORMAL
BILIRUB SERPL-MCNC: 3.5 MG/DL (ref 0.2–0.7)
BILIRUB UR QL STRIP: ABNORMAL
BUN SERPL-MCNC: 8 MG/DL (ref 6–20)
CALCIUM SERPL-MCNC: 9.1 MG/DL (ref 8.5–9.9)
CHLORIDE SERPL-SCNC: 101 MEQ/L (ref 95–107)
CK SERPL-CCNC: 42 U/L (ref 0–190)
CLARITY UR: CLEAR
CO2 SERPL-SCNC: 28 MEQ/L (ref 20–31)
COCAINE UR QL SCN: ABNORMAL
COLOR UR: ABNORMAL
CREAT SERPL-MCNC: 0.76 MG/DL (ref 0.7–1.2)
DRUG SCREEN COMMENT UR-IMP: ABNORMAL
EOSINOPHIL # BLD: 0 K/UL (ref 0–0.5)
EOSINOPHIL NFR BLD: 0.9 % (ref 0.8–7)
EPI CELLS #/AREA URNS HPF: ABNORMAL /HPF
ERYTHROCYTE [DISTWIDTH] IN BLOOD BY AUTOMATED COUNT: 12.5 % (ref 11.6–14.4)
ETHANOL PERCENT: NORMAL G/DL
ETHANOLAMINE SERPL-MCNC: <10 MG/DL (ref 0–0.08)
GLOBULIN SER CALC-MCNC: 2.2 G/DL (ref 2.3–3.5)
GLUCOSE SERPL-MCNC: 132 MG/DL (ref 70–99)
GLUCOSE UR STRIP-MCNC: NEGATIVE MG/DL
HCT VFR BLD AUTO: 48.6 % (ref 42–52)
HGB BLD-MCNC: 17.9 G/DL (ref 13.7–17.5)
HGB UR QL STRIP: ABNORMAL
IMM GRANULOCYTES # BLD: 0 K/UL
IMM GRANULOCYTES NFR BLD: 0.2 %
KETONES UR STRIP-MCNC: 15 MG/DL
LEUKOCYTE ESTERASE UR QL STRIP: ABNORMAL
LYMPHOCYTES # BLD: 0.8 K/UL (ref 1.3–3.6)
LYMPHOCYTES NFR BLD: 17.5 %
MAGNESIUM SERPL-MCNC: 1.9 MG/DL (ref 1.7–2.4)
MCH RBC QN AUTO: 36.2 PG (ref 25.7–32.2)
MCHC RBC AUTO-ENTMCNC: 36.8 % (ref 32.3–36.5)
MCV RBC AUTO: 98.2 FL (ref 79–92.2)
MONOCYTES # BLD: 0.4 K/UL (ref 0.3–0.8)
MONOCYTES NFR BLD: 8.7 % (ref 5.3–12.2)
NEUTROPHILS # BLD: 3.3 K/UL (ref 1.8–5.4)
NEUTS SEG NFR BLD: 72.3 % (ref 34–67.9)
NITRITE UR QL STRIP: NEGATIVE
OPIATES UR QL SCN: ABNORMAL
PCP UR QL SCN>25 NG/ML: ABNORMAL
PH UR STRIP: 6 [PH] (ref 5–9)
PLATELET # BLD AUTO: 216 K/UL (ref 163–337)
POTASSIUM SERPL-SCNC: 3.7 MEQ/L (ref 3.4–4.9)
PROT SERPL-MCNC: 6.6 G/DL (ref 6.3–8)
PROT UR STRIP-MCNC: 30 MG/DL
RBC # BLD AUTO: 4.95 M/UL (ref 4.63–6.08)
RBC #/AREA URNS HPF: ABNORMAL /HPF (ref 0–2)
SALICYLATES SERPL-MCNC: <0.3 MG/DL (ref 15–30)
SODIUM SERPL-SCNC: 138 MEQ/L (ref 135–144)
SP GR UR STRIP: 1.02 (ref 1–1.03)
THC UR QL SCN>50 NG/ML: POSITIVE
TRICYCLICS UR QL SCN: ABNORMAL
TROPONIN, HIGH SENSITIVITY: <6 NG/L (ref 0–19)
TROPONIN, HIGH SENSITIVITY: <6 NG/L (ref 0–19)
TSH REFLEX: 1.88 UIU/ML (ref 0.44–3.86)
URINE REFLEX TO CULTURE: ABNORMAL
UROBILINOGEN UR STRIP-ACNC: 4 E.U./DL
WBC # BLD AUTO: 4.6 K/UL (ref 4.2–9)
WBC #/AREA URNS HPF: ABNORMAL /HPF (ref 0–5)

## 2025-02-27 PROCEDURE — 96361 HYDRATE IV INFUSION ADD-ON: CPT

## 2025-02-27 PROCEDURE — 96375 TX/PRO/DX INJ NEW DRUG ADDON: CPT

## 2025-02-27 PROCEDURE — 6370000000 HC RX 637 (ALT 250 FOR IP): Performed by: STUDENT IN AN ORGANIZED HEALTH CARE EDUCATION/TRAINING PROGRAM

## 2025-02-27 PROCEDURE — 83735 ASSAY OF MAGNESIUM: CPT

## 2025-02-27 PROCEDURE — 83036 HEMOGLOBIN GLYCOSYLATED A1C: CPT

## 2025-02-27 PROCEDURE — 99214 OFFICE O/P EST MOD 30 MIN: CPT | Performed by: FAMILY MEDICINE

## 2025-02-27 PROCEDURE — 6360000002 HC RX W HCPCS: Performed by: STUDENT IN AN ORGANIZED HEALTH CARE EDUCATION/TRAINING PROGRAM

## 2025-02-27 PROCEDURE — 2500000003 HC RX 250 WO HCPCS: Performed by: STUDENT IN AN ORGANIZED HEALTH CARE EDUCATION/TRAINING PROGRAM

## 2025-02-27 PROCEDURE — 93005 ELECTROCARDIOGRAM TRACING: CPT

## 2025-02-27 PROCEDURE — 96374 THER/PROPH/DIAG INJ IV PUSH: CPT

## 2025-02-27 PROCEDURE — 80179 DRUG ASSAY SALICYLATE: CPT

## 2025-02-27 PROCEDURE — 71045 X-RAY EXAM CHEST 1 VIEW: CPT

## 2025-02-27 PROCEDURE — 85025 COMPLETE CBC W/AUTO DIFF WBC: CPT

## 2025-02-27 PROCEDURE — 80143 DRUG ASSAY ACETAMINOPHEN: CPT

## 2025-02-27 PROCEDURE — 84484 ASSAY OF TROPONIN QUANT: CPT

## 2025-02-27 PROCEDURE — 80306 DRUG TEST PRSMV INSTRMNT: CPT

## 2025-02-27 PROCEDURE — 80053 COMPREHEN METABOLIC PANEL: CPT

## 2025-02-27 PROCEDURE — 93000 ELECTROCARDIOGRAM COMPLETE: CPT | Performed by: FAMILY MEDICINE

## 2025-02-27 PROCEDURE — 82077 ASSAY SPEC XCP UR&BREATH IA: CPT

## 2025-02-27 PROCEDURE — 82550 ASSAY OF CK (CPK): CPT

## 2025-02-27 PROCEDURE — 81001 URINALYSIS AUTO W/SCOPE: CPT

## 2025-02-27 PROCEDURE — 99285 EMERGENCY DEPT VISIT HI MDM: CPT

## 2025-02-27 PROCEDURE — 36415 COLL VENOUS BLD VENIPUNCTURE: CPT

## 2025-02-27 PROCEDURE — 84443 ASSAY THYROID STIM HORMONE: CPT

## 2025-02-27 PROCEDURE — 2580000003 HC RX 258: Performed by: STUDENT IN AN ORGANIZED HEALTH CARE EDUCATION/TRAINING PROGRAM

## 2025-02-27 RX ORDER — ONDANSETRON 2 MG/ML
4 INJECTION INTRAMUSCULAR; INTRAVENOUS ONCE
Status: COMPLETED | OUTPATIENT
Start: 2025-02-27 | End: 2025-02-27

## 2025-02-27 RX ORDER — 0.9 % SODIUM CHLORIDE 0.9 %
1000 INTRAVENOUS SOLUTION INTRAVENOUS ONCE
Status: COMPLETED | OUTPATIENT
Start: 2025-02-27 | End: 2025-02-27

## 2025-02-27 RX ORDER — CEPHALEXIN 500 MG/1
500 CAPSULE ORAL 2 TIMES DAILY
Qty: 14 CAPSULE | Refills: 0 | Status: SHIPPED | OUTPATIENT
Start: 2025-02-27 | End: 2025-03-06

## 2025-02-27 RX ORDER — OMEPRAZOLE 20 MG/1
20 CAPSULE, DELAYED RELEASE ORAL
Qty: 30 CAPSULE | Refills: 0 | Status: SHIPPED | OUTPATIENT
Start: 2025-02-27

## 2025-02-27 RX ORDER — CEPHALEXIN 500 MG/1
500 CAPSULE ORAL ONCE
Status: COMPLETED | OUTPATIENT
Start: 2025-02-27 | End: 2025-02-27

## 2025-02-27 RX ORDER — LORAZEPAM 2 MG/ML
1 INJECTION INTRAMUSCULAR ONCE
Status: COMPLETED | OUTPATIENT
Start: 2025-02-27 | End: 2025-02-27

## 2025-02-27 RX ADMIN — ONDANSETRON 4 MG: 2 INJECTION, SOLUTION INTRAMUSCULAR; INTRAVENOUS at 16:30

## 2025-02-27 RX ADMIN — FAMOTIDINE 20 MG: 10 INJECTION, SOLUTION INTRAVENOUS at 16:31

## 2025-02-27 RX ADMIN — SODIUM CHLORIDE 1000 ML: 0.9 INJECTION, SOLUTION INTRAVENOUS at 16:30

## 2025-02-27 RX ADMIN — LORAZEPAM 1 MG: 2 INJECTION INTRAMUSCULAR; INTRAVENOUS at 16:30

## 2025-02-27 RX ADMIN — CEPHALEXIN 500 MG: 500 CAPSULE ORAL at 18:58

## 2025-02-27 RX ADMIN — LIDOCAINE HYDROCHLORIDE: 20 SOLUTION ORAL at 16:31

## 2025-02-27 ASSESSMENT — LIFESTYLE VARIABLES
HOW MANY STANDARD DRINKS CONTAINING ALCOHOL DO YOU HAVE ON A TYPICAL DAY: 7 TO 9
HOW OFTEN DO YOU HAVE A DRINK CONTAINING ALCOHOL: MONTHLY OR LESS

## 2025-02-27 ASSESSMENT — PAIN - FUNCTIONAL ASSESSMENT
PAIN_FUNCTIONAL_ASSESSMENT: NONE - DENIES PAIN
PAIN_FUNCTIONAL_ASSESSMENT: NONE - DENIES PAIN

## 2025-02-27 NOTE — ED NOTES
Pt is aware of need for urine sample.  Pt states he is unable to give a sample at this time.  IVF are almost infused.

## 2025-02-27 NOTE — ED TRIAGE NOTES
Patient here with complaints of numbness to his arms and fingertips. Right arm/ fingers worse than left.  He saw Dr. Ruelas today for a follow up from his ER visit on 02/23/2025 and reported chest pain, therefore, he was referred to ED.  Patient reports being sober from alcohol for about 15 years. However, he states last week he drank a 750 ml bottle of [place] in three days. He believes he is experiencing \"DTs\" from that episode.  Denies any chest pain at this time.

## 2025-02-27 NOTE — ED PROVIDER NOTES
[facility] EMERGENCY DEPARTMENT  eMERGENCY dEPARTMENT eNCOUnter      Pt Name: Duncan Tripathi  MRN: [id number]  Birthdate 1980  Date of evaluation: 2/27/2025  Provider: Ruddy Garza MD  Note Started: 2/27/25 3:59 PM EST    HISTORY OF PRESENT ILLNESS      Chief Complaint   Patient presents with    Numbness     Bilateral arms/ fingertips. R>L.       The history is provided by the Patient.  Duncan Tripathi is a 44 y.o. male with a PMH clinically significant for Blindness, Neuropathy, HTN, HLD, Chronic back pain, Anxiety, Depression, Bipolar I D/o, Tobacco Use, and Etoh Use presenting to the ED via Self c/o feeling generally weak, fatigued and with symptoms worse with any type of exertion at home ongoing over the past 4 to 5 days after episode of binge drinking earlier this week from Monday through Wednesday.  States he has had associated numbness/tingling in bilateral hands, left-sided chest pain radiating to left arm that is worse with movement, lightheadedness and nausea with decreased solid p.o. intake.  Has had some epigastric abdominal discomfort/burning as well.  States he was concerned about the chest pain and the possibility of DTs.  Reports that he received very unfortunate news last week which led him to drink.  Was previously sober for 15 years prior to that.  States he has had bowel movements a little bit soft, but not quite diarrhea. Does feel anxious and depressed.   Denies any associated: Fevers, HA, Focal Weakness, Cough, Hemoptysis, SOB, New or worsening BLE Edema/pain, Vomiting, Constipation, Dysuria, Hematuria, or Difficulty urinating.    Per Chart Review: PMH as noted above obtained via outpatient chart review.   Evaluation in the ED on 2/23/25 appreciated.  Labs obtained at that time showing negative COVID/flu and largely unremarkable CMP, CBC, lipase.  Ethanol negative.    REVIEW OF SYSTEMS       Review of Systems  Otherwise as noted in HPI    PAST MEDICAL HISTORY     Past Medical History:  anxious, and mildly hypertensive, but otherwise Afebrile, HDS and in NAD. PE as noted above.  Labs, EKG, and Imaging visualized and interpreted by myself as noted above in ED Course.  Given findings, clinical presentation most likely consistent w/ possible mild dehydration, EtOH induced gastritis, mild UTI and anxiety contributing to symptoms in the ED.  Not consistent with DTs timeline with no recent EtOH use.  Was found to be THC positive.  No evidence of atypical ACS with nonischemic EKG and negative high-sensitivity troponins.  Chest pain reproducible with palpation, likely musculoskeletal in etiology.  Low suspicion for DVT/PE.  No evidence of significant electrolyte abnormalities.  Not significantly anemic.  No findings concerning for CVA or acute neurological deficits given symptoms are bilateral.  Possible neuropathy, but noted in patient's past chart.  Will treat for UTI out of an abundance of caution.  Stable for further evaluation and management as an outpatient.  Already has follow-up with psychiatry.  Not endorsing any suicidal ideation, homicidal ideations, auditory or visual hallucinations.  Not appreciated to be a danger to self at this time.  Pt was administered   Medications   cephALEXin (KEFLEX) capsule 500 mg (has no administration in time range)   sodium chloride 0.9 % bolus 1,000 mL (0 mLs IntraVENous Stopped 2/27/25 1801)   ondansetron (ZOFRAN) injection 4 mg (4 mg IntraVENous Given 2/27/25 1630)   famotidine (PEPCID) 20 MG/2ML 20 mg in sodium chloride (PF) 0.9 % 10 mL injection (20 mg IntraVENous Given 2/27/25 1631)   aluminum & magnesium hydroxide-simethicone (MAALOX PLUS) 30 mL, lidocaine viscous hcl (XYLOCAINE) 5 mL (GI COCKTAIL) ( Oral Given 2/27/25 1631)   LORazepam (ATIVAN) injection 1 mg (1 mg IntraVENous Given 2/27/25 1630)       Plan: Discharge home in good condition with meds as noted below and instructions to follow up with PCP and Psychiatry. Pt stable and appropriate for further

## 2025-02-28 LAB
EKG ATRIAL RATE: 80 BPM
EKG P AXIS: 49 DEGREES
EKG P-R INTERVAL: 140 MS
EKG Q-T INTERVAL: 372 MS
EKG QRS DURATION: 96 MS
EKG QTC CALCULATION (BAZETT): 429 MS
EKG R AXIS: -40 DEGREES
EKG T AXIS: 47 DEGREES
EKG VENTRICULAR RATE: 80 BPM
ESTIMATED AVERAGE GLUCOSE: 85 MG/DL
HBA1C MFR BLD: 4.6 % (ref 4–6)

## 2025-02-28 PROCEDURE — 93010 ELECTROCARDIOGRAM REPORT: CPT | Performed by: INTERNAL MEDICINE

## 2025-03-10 ENCOUNTER — OFFICE VISIT (OUTPATIENT)
Age: 45
End: 2025-03-10

## 2025-03-10 DIAGNOSIS — F43.29 ADJUSTMENT DISORDER WITH DISTURBANCE OF EMOTION: Primary | ICD-10-CM

## 2025-03-10 PROCEDURE — NBSRV NON-BILLABLE SERVICE: Performed by: PSYCHOLOGIST

## 2025-03-10 ASSESSMENT — ANXIETY QUESTIONNAIRES
1. FEELING NERVOUS, ANXIOUS, OR ON EDGE: SEVERAL DAYS
2. NOT BEING ABLE TO STOP OR CONTROL WORRYING: SEVERAL DAYS
7. FEELING AFRAID AS IF SOMETHING AWFUL MIGHT HAPPEN: NOT AT ALL
GAD7 TOTAL SCORE: 6
7. FEELING AFRAID AS IF SOMETHING AWFUL MIGHT HAPPEN: NOT AT ALL
5. BEING SO RESTLESS THAT IT IS HARD TO SIT STILL: SEVERAL DAYS
6. BECOMING EASILY ANNOYED OR IRRITABLE: SEVERAL DAYS
IF YOU CHECKED OFF ANY PROBLEMS ON THIS QUESTIONNAIRE, HOW DIFFICULT HAVE THESE PROBLEMS MADE IT FOR YOU TO DO YOUR WORK, TAKE CARE OF THINGS AT HOME, OR GET ALONG WITH OTHER PEOPLE: SOMEWHAT DIFFICULT
4. TROUBLE RELAXING: SEVERAL DAYS
IF YOU CHECKED OFF ANY PROBLEMS ON THIS QUESTIONNAIRE, HOW DIFFICULT HAVE THESE PROBLEMS MADE IT FOR YOU TO DO YOUR WORK, TAKE CARE OF THINGS AT HOME, OR GET ALONG WITH OTHER PEOPLE: SOMEWHAT DIFFICULT
4. TROUBLE RELAXING: SEVERAL DAYS
5. BEING SO RESTLESS THAT IT IS HARD TO SIT STILL: SEVERAL DAYS
3. WORRYING TOO MUCH ABOUT DIFFERENT THINGS: SEVERAL DAYS
2. NOT BEING ABLE TO STOP OR CONTROL WORRYING: SEVERAL DAYS
3. WORRYING TOO MUCH ABOUT DIFFERENT THINGS: SEVERAL DAYS
6. BECOMING EASILY ANNOYED OR IRRITABLE: SEVERAL DAYS
1. FEELING NERVOUS, ANXIOUS, OR ON EDGE: SEVERAL DAYS

## 2025-03-10 ASSESSMENT — PATIENT HEALTH QUESTIONNAIRE - PHQ9
SUM OF ALL RESPONSES TO PHQ QUESTIONS 1-9: 12
9. THOUGHTS THAT YOU WOULD BE BETTER OFF DEAD, OR OF HURTING YOURSELF: NOT AT ALL
SUM OF ALL RESPONSES TO PHQ QUESTIONS 1-9: 12
10. IF YOU CHECKED OFF ANY PROBLEMS, HOW DIFFICULT HAVE THESE PROBLEMS MADE IT FOR YOU TO DO YOUR WORK, TAKE CARE OF THINGS AT HOME, OR GET ALONG WITH OTHER PEOPLE: SOMEWHAT DIFFICULT
6. FEELING BAD ABOUT YOURSELF - OR THAT YOU ARE A FAILURE OR HAVE LET YOURSELF OR YOUR FAMILY DOWN: MORE THAN HALF THE DAYS
1. LITTLE INTEREST OR PLEASURE IN DOING THINGS: MORE THAN HALF THE DAYS
4. FEELING TIRED OR HAVING LITTLE ENERGY: SEVERAL DAYS
5. POOR APPETITE OR OVEREATING: MORE THAN HALF THE DAYS
SUM OF ALL RESPONSES TO PHQ QUESTIONS 1-9: 12
SUM OF ALL RESPONSES TO PHQ QUESTIONS 1-9: 12
1. LITTLE INTEREST OR PLEASURE IN DOING THINGS: MORE THAN HALF THE DAYS
6. FEELING BAD ABOUT YOURSELF - OR THAT YOU ARE A FAILURE OR HAVE LET YOURSELF OR YOUR FAMILY DOWN: MORE THAN HALF THE DAYS
2. FEELING DOWN, DEPRESSED OR HOPELESS: SEVERAL DAYS
SUM OF ALL RESPONSES TO PHQ QUESTIONS 1-9: 12
3. TROUBLE FALLING OR STAYING ASLEEP: MORE THAN HALF THE DAYS
9. THOUGHTS THAT YOU WOULD BE BETTER OFF DEAD, OR OF HURTING YOURSELF: NOT AT ALL
7. TROUBLE CONCENTRATING ON THINGS, SUCH AS READING THE NEWSPAPER OR WATCHING TELEVISION: SEVERAL DAYS
3. TROUBLE FALLING OR STAYING ASLEEP: MORE THAN HALF THE DAYS
8. MOVING OR SPEAKING SO SLOWLY THAT OTHER PEOPLE COULD HAVE NOTICED. OR THE OPPOSITE - BEING SO FIDGETY OR RESTLESS THAT YOU HAVE BEEN MOVING AROUND A LOT MORE THAN USUAL: SEVERAL DAYS
5. POOR APPETITE OR OVEREATING: MORE THAN HALF THE DAYS
2. FEELING DOWN, DEPRESSED OR HOPELESS: SEVERAL DAYS
8. MOVING OR SPEAKING SO SLOWLY THAT OTHER PEOPLE COULD HAVE NOTICED. OR THE OPPOSITE, BEING SO FIGETY OR RESTLESS THAT YOU HAVE BEEN MOVING AROUND A LOT MORE THAN USUAL: SEVERAL DAYS
4. FEELING TIRED OR HAVING LITTLE ENERGY: SEVERAL DAYS
10. IF YOU CHECKED OFF ANY PROBLEMS, HOW DIFFICULT HAVE THESE PROBLEMS MADE IT FOR YOU TO DO YOUR WORK, TAKE CARE OF THINGS AT HOME, OR GET ALONG WITH OTHER PEOPLE: SOMEWHAT DIFFICULT
7. TROUBLE CONCENTRATING ON THINGS, SUCH AS READING THE NEWSPAPER OR WATCHING TELEVISION: SEVERAL DAYS

## 2025-03-10 NOTE — PROGRESS NOTES
Behavioral Health Consultation  Lisa Ingram Psy.D.  Psychologist  3/10/25  9:58 AM EDT      Patient seen for brief encounter.  After informing patient and his mother that this provider was leaving [facility], patient opted to establish with a different provider for ongoing care.      Provided patient with referrals for counselors and psychiatry in the area.

## 2025-03-10 NOTE — PATIENT INSTRUCTIONS
Psychiatry:    Dr. Savage     [address], [place] [address]     [phone]       Dr. Ya  [address] [facility] [facility] [place] [address] [phone]        Counseling  [facility]     [address] [place] [address] [phone]      Dr. Gurvinder Calderón  [phone]    [facility]  Counselors:  Natalie Schafer May  [address] [place] [address]   Phone [phone]  Psychiatry also available

## 2025-03-21 ENCOUNTER — HOSPITAL ENCOUNTER (EMERGENCY)
Age: 45
Discharge: HOME | End: 2025-03-21
Payer: MEDICARE

## 2025-03-21 ENCOUNTER — APPOINTMENT (OUTPATIENT)
Dept: GENERAL RADIOLOGY | Age: 45
End: 2025-03-21
Payer: MEDICARE

## 2025-03-21 VITALS
TEMPERATURE: 98.3 F | OXYGEN SATURATION: 100 % | SYSTOLIC BLOOD PRESSURE: 144 MMHG | HEIGHT: 72 IN | WEIGHT: 205 LBS | BODY MASS INDEX: 27.77 KG/M2 | RESPIRATION RATE: 18 BRPM | DIASTOLIC BLOOD PRESSURE: 102 MMHG | HEART RATE: 85 BPM

## 2025-03-21 DIAGNOSIS — M25.562 ACUTE PAIN OF LEFT KNEE: Primary | ICD-10-CM

## 2025-03-21 PROCEDURE — 6360000002 HC RX W HCPCS

## 2025-03-21 PROCEDURE — 73560 X-RAY EXAM OF KNEE 1 OR 2: CPT

## 2025-03-21 PROCEDURE — 96372 THER/PROPH/DIAG INJ SC/IM: CPT

## 2025-03-21 PROCEDURE — 99284 EMERGENCY DEPT VISIT MOD MDM: CPT

## 2025-03-21 RX ORDER — METHYLPREDNISOLONE 4 MG/1
TABLET ORAL
Qty: 1 KIT | Refills: 0 | Status: SHIPPED | OUTPATIENT
Start: 2025-03-21 | End: 2025-03-27

## 2025-03-21 RX ORDER — KETOROLAC TROMETHAMINE 10 MG/1
10 TABLET, FILM COATED ORAL EVERY 6 HOURS PRN
Qty: 20 TABLET | Refills: 0 | Status: SHIPPED | OUTPATIENT
Start: 2025-03-21

## 2025-03-21 RX ORDER — KETOROLAC TROMETHAMINE 30 MG/ML
30 INJECTION, SOLUTION INTRAMUSCULAR; INTRAVENOUS ONCE
Status: COMPLETED | OUTPATIENT
Start: 2025-03-21 | End: 2025-03-21

## 2025-03-21 RX ADMIN — KETOROLAC TROMETHAMINE 30 MG: 30 INJECTION, SOLUTION INTRAMUSCULAR at 19:43

## 2025-03-21 ASSESSMENT — PAIN DESCRIPTION - FREQUENCY: FREQUENCY: CONTINUOUS

## 2025-03-21 ASSESSMENT — PAIN DESCRIPTION - ORIENTATION: ORIENTATION: LEFT

## 2025-03-21 ASSESSMENT — PAIN - FUNCTIONAL ASSESSMENT
PAIN_FUNCTIONAL_ASSESSMENT: 0-10
PAIN_FUNCTIONAL_ASSESSMENT: PREVENTS OR INTERFERES SOME ACTIVE ACTIVITIES AND ADLS

## 2025-03-21 ASSESSMENT — PAIN DESCRIPTION - ONSET: ONSET: SUDDEN

## 2025-03-21 ASSESSMENT — LIFESTYLE VARIABLES
HOW OFTEN DO YOU HAVE A DRINK CONTAINING ALCOHOL: 2-3 TIMES A WEEK
HOW MANY STANDARD DRINKS CONTAINING ALCOHOL DO YOU HAVE ON A TYPICAL DAY: 3 OR 4

## 2025-03-21 ASSESSMENT — PAIN DESCRIPTION - PAIN TYPE: TYPE: ACUTE PAIN

## 2025-03-21 ASSESSMENT — PAIN DESCRIPTION - LOCATION: LOCATION: KNEE

## 2025-03-21 ASSESSMENT — PAIN SCALES - GENERAL: PAINLEVEL_OUTOF10: 8

## 2025-03-21 ASSESSMENT — PAIN DESCRIPTION - DESCRIPTORS: DESCRIPTORS: SHARP;THROBBING

## 2025-03-21 NOTE — ED NOTES
Fitted with knee brace.  Pt has his own crutches.  I fitted them to him.  He was able to return demonstrate use.

## 2025-03-22 NOTE — ED PROVIDER NOTES
[facility] EMERGENCY DEPARTMENT  EMERGENCY DEPARTMENT ENCOUNTER      Pt Name: Duncan Tripathi  MRN: [id number]  Birthdate 1980  Date of evaluation: 3/21/2025  Provider: JOSEFINA Ortiz  8:16 PM EDT    CHIEF COMPLAINT       Chief Complaint   Patient presents with    Knee Pain     left         HISTORY OF PRESENT ILLNESS   (Location/Symptom, Timing/Onset, Context/Setting, Quality, Duration, Modifying Factors, Severity)  Note limiting factors.   Duncan Tripathi is a 44 y.o. male whom per chart review has a PMHx of vitamin D deficiency, anxiety, lumbar DDD, bipolar disorder, chronic back pain, neuropathy, depression, hypertension, legal blindness presents to ED for evaluation of L knee pain.  Patient reports that his approximate 52 pound dog stepped on his L knee today and patient reports that he has had pain, swelling since.  Patient does present to the ED with use of crutches.  States that he is able to bear weight on his LLE, however endorses increased pain to his L knee with ambulation.  Patient denies taking any OTC medications for ROS.  No additional complaints verbalized.  Patient denies chest pain, shortness of breath, N/V/D, fever, chills.    HPI    Nursing Notes were reviewed.    REVIEW OF SYSTEMS    (2-9 systems for level 4, 10 or more for level 5)     Review of Systems   Musculoskeletal:  Positive for arthralgias (L knee).   All other systems reviewed and are negative.      Except as noted above the remainder of the review of systems was reviewed and negative.       PAST MEDICAL HISTORY     Past Medical History:   Diagnosis Date    Anxiety     Bipolar disorder (HCC)     Chronic back pain 2014    Depression     Hypertension     Lumbar degenerative disc disease 09/21/2017    Lumbar radicular pain     Neuropathy          SURGICAL HISTORY       Past Surgical History:   Procedure Laterality Date    BACK SURGERY N/A 2014    DENTAL SURGERY  2010    all    KNEE ARTHROSCOPY  1995    rt knee    KNEE SURGERY    [facility]  [address]  [address]  [facility] [address]  [phone]  In 3 days      [facility] Emergency Department  [address]  [facility] [address]  [phone]    If symptoms worsen      DISCHARGE MEDICATIONS:  Discharge Medication List as of 3/21/2025  7:36 PM        START taking these medications    Details   ketorolac (TORADOL) 10 MG tablet Take 1 tablet by mouth every 6 hours as needed for Pain, Disp-20 tablet, R-0Normal      methylPREDNISolone (MEDROL, ERIC,) 4 MG tablet Take by mouth., Disp-1 kit, R-0Normal           Controlled Substances Monitoring:     RX Monitoring Periodic Controlled Substance Monitoring   9/2/2022   3:59 PM Possible medication side effects, risk of tolerance/dependence & alternative treatments discussed.;No signs of potential drug abuse or diversion identified.;Assessed functional status.;Obtaining appropriate analgesic effect of treatment.       (Please note that portions of this note were completed with a voice recognition program.  Efforts were made to edit the dictations but occasionally words are mis-transcribed.)    JOSEFINA Ortiz (electronically signed)    Supervising Attending Physician: Dr. Santana.     Latoya Cardozo, ARTC  03/22/25 [id number]

## 2025-03-26 SDOH — HEALTH STABILITY: PHYSICAL HEALTH: ON AVERAGE, HOW MANY MINUTES DO YOU ENGAGE IN EXERCISE AT THIS LEVEL?: 10 MIN

## 2025-03-26 SDOH — HEALTH STABILITY: PHYSICAL HEALTH: ON AVERAGE, HOW MANY DAYS PER WEEK DO YOU ENGAGE IN MODERATE TO STRENUOUS EXERCISE (LIKE A BRISK WALK)?: 1 DAY

## 2025-03-27 ENCOUNTER — OFFICE VISIT (OUTPATIENT)
Dept: ORTHOPEDIC SURGERY | Age: 45
End: 2025-03-27

## 2025-03-27 VITALS
BODY MASS INDEX: 28.44 KG/M2 | HEART RATE: 89 BPM | TEMPERATURE: 97.5 F | OXYGEN SATURATION: 98 % | WEIGHT: 210 LBS | HEIGHT: 72 IN

## 2025-03-27 DIAGNOSIS — M25.462 PAIN AND SWELLING OF LEFT KNEE: Primary | ICD-10-CM

## 2025-03-27 DIAGNOSIS — M25.562 PAIN AND SWELLING OF LEFT KNEE: Primary | ICD-10-CM

## 2025-03-27 NOTE — PROGRESS NOTES
Orthopedic Surgery and Sports Medicine    Subjective:      Patient ID: Duncan Tripathi is a 44 y.o. male who presents today for:  Chief Complaint   Patient presents with    Knee Injury     Pt's dog stepped on the outside of his left knee on 3/21/25 and Pt has been having pain ever since. He did go to [facility] that same day. He was unable to straighten his leg and has a lot of pain doing so now. Pain level is 7-8/10 today.       Duncan Tripathi was sent to me by Latoya ROCHA for evaluation of the patient's left knee.      HPI  Duncan is a 44-year-old male who presents today for evaluation of his left knee pain.  He sustained an injury to his knee on 3/21/2025.  This occurred when his dog stepped on the outside of his left knee.  He was seen in the emergency room that day.  He was given a knee immobilizer and has been using crutches.  He reports significant pain on the lateral aspect of the knee.  He reports swelling in the knee.  He has been using crutches due to inability to ambulate without them.    Previous treatment: Knee immobilizer, crutches  NSAIDs: Yes  Physical therapy: No    Occupation: Works at a restaurant  Workers Compensation:   Have you missed work for this issue? Yes  Is this issue being addressed under a worker's compensation claim? No    Past Medical History:   Diagnosis Date    Anxiety     Bipolar disorder (HCC)     Chronic back pain 2014    Depression     Hypertension     Lumbar degenerative disc disease 09/21/2017    Lumbar radicular pain     Neuropathy       Past Surgical History:   Procedure Laterality Date    BACK SURGERY N/A 2014    DENTAL SURGERY  2010    all    KNEE ARTHROSCOPY  1995    rt knee    KNEE SURGERY       Social History     Socioeconomic History    Marital status: Single     Spouse name: Not on file    Number of children: Not on file    Years of education: Not on file    Highest education level: Not on file   Occupational History    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   Tobacco Use

## 2025-04-02 ENCOUNTER — HOSPITAL ENCOUNTER (EMERGENCY)
Age: 45
Discharge: HOME | End: 2025-04-02
Attending: EMERGENCY MEDICINE
Payer: MEDICARE

## 2025-04-02 VITALS
SYSTOLIC BLOOD PRESSURE: 151 MMHG | RESPIRATION RATE: 16 BRPM | HEART RATE: 92 BPM | TEMPERATURE: 97.5 F | OXYGEN SATURATION: 99 % | DIASTOLIC BLOOD PRESSURE: 96 MMHG

## 2025-04-02 DIAGNOSIS — M79.89 LEG SWELLING: Primary | ICD-10-CM

## 2025-04-02 LAB — D DIMER PPP FEU-MCNC: <0.27 MG/L FEU (ref 0–0.5)

## 2025-04-02 PROCEDURE — 85379 FIBRIN DEGRADATION QUANT: CPT

## 2025-04-02 PROCEDURE — 36415 COLL VENOUS BLD VENIPUNCTURE: CPT

## 2025-04-02 PROCEDURE — 99283 EMERGENCY DEPT VISIT LOW MDM: CPT

## 2025-04-02 ASSESSMENT — ENCOUNTER SYMPTOMS
ABDOMINAL PAIN: 0
WHEEZING: 0
VOMITING: 0
EYE DISCHARGE: 0
BACK PAIN: 0
SHORTNESS OF BREATH: 0
COUGH: 0
ABDOMINAL DISTENTION: 0
CHEST TIGHTNESS: 0
PHOTOPHOBIA: 0
SORE THROAT: 0

## 2025-04-02 ASSESSMENT — LIFESTYLE VARIABLES
HOW MANY STANDARD DRINKS CONTAINING ALCOHOL DO YOU HAVE ON A TYPICAL DAY: 3 OR 4
HOW OFTEN DO YOU HAVE A DRINK CONTAINING ALCOHOL: 4 OR MORE TIMES A WEEK

## 2025-04-02 ASSESSMENT — PAIN SCALES - GENERAL: PAINLEVEL_OUTOF10: 7

## 2025-04-02 ASSESSMENT — PAIN DESCRIPTION - LOCATION: LOCATION: LEG

## 2025-04-02 ASSESSMENT — PAIN DESCRIPTION - ORIENTATION: ORIENTATION: LEFT

## 2025-04-03 NOTE — ED NOTES
DC instructions explained and reviewed. Pt demonstrates understanding. No outward signs of acute distress noted. Patient ambulatory on discharge. No outward signs of acute distress noted.

## 2025-04-03 NOTE — ED TRIAGE NOTES
Patient to ED due to left leg bruise and swelling. Patient states this has been ongoing for the past 3-4 days. Patient states he had a leg injury on 3/21. Patient ambulatory to triage w/o assistance.

## 2025-04-03 NOTE — ED PROVIDER NOTES
[facility] EMERGENCY DEPARTMENT  eMERGENCY dEPARTMENT eNCOUnter      Pt Name: Duncan Tripathi  MRN: [id number]  Birthdate 1980  Date of evaluation: 4/2/2025  Provider: Denny Santana MD    CHIEF COMPLAINT       Chief Complaint   Patient presents with    Leg Swelling     Leg swelling and bruising in left leg after injury on 3/21         HISTORY OF PRESENT ILLNESS   (Location/Symptom, Timing/Onset,Context/Setting, Quality, Duration, Modifying Factors, Severity)  Note limiting factors.   Duncan Tripathi is a 44 y.o. male who presents to the emergency department with past history of bipolar, anxiety, hypertension and neuropathy.  He also wears supportive lower leg braces for gait disturbance.  Patient had a mechanical fall 10 days ago injuring his left knee.  X-rays negative.  Follow-up they are trying to get approval for MRI but that still pending to be completed.  Presents tonight because he has had persistent left lower leg swelling and pain.  He denies chest pain or shortness of breath.  Never had a blood clot before.  He is not on any blood thinners.    HPI    NursingNotes were reviewed.    REVIEW OF SYSTEMS    (2-9 systems for level 4, 10 or more for level 5)     Review of Systems   Constitutional:  Negative for chills and diaphoresis.   HENT:  Negative for congestion, ear pain, mouth sores and sore throat.    Eyes:  Negative for photophobia and discharge.   Respiratory:  Negative for cough, chest tightness, shortness of breath and wheezing.    Cardiovascular:  Negative for chest pain and palpitations.   Gastrointestinal:  Negative for abdominal distention, abdominal pain and vomiting.   Endocrine: Negative for cold intolerance.   Genitourinary:  Negative for difficulty urinating.   Musculoskeletal:  Positive for gait problem. Negative for arthralgias and back pain.   Skin:  Negative for pallor and rash.   Allergic/Immunologic: Negative for immunocompromised state.   Neurological:  Negative for dizziness and

## 2025-04-16 ENCOUNTER — HOSPITAL ENCOUNTER (OUTPATIENT)
Dept: MRI IMAGING | Age: 45
Discharge: HOME | End: 2025-04-18
Payer: MEDICARE

## 2025-04-16 DIAGNOSIS — M25.562 PAIN AND SWELLING OF LEFT KNEE: ICD-10-CM

## 2025-04-16 DIAGNOSIS — M25.462 PAIN AND SWELLING OF LEFT KNEE: ICD-10-CM

## 2025-04-16 PROCEDURE — 73721 MRI JNT OF LWR EXTRE W/O DYE: CPT

## 2025-04-24 ENCOUNTER — OFFICE VISIT (OUTPATIENT)
Dept: ORTHOPEDIC SURGERY | Age: 45
End: 2025-04-24
Payer: MEDICARE

## 2025-04-24 VITALS
TEMPERATURE: 97.1 F | BODY MASS INDEX: 27.83 KG/M2 | WEIGHT: 210 LBS | HEART RATE: 75 BPM | HEIGHT: 73 IN | OXYGEN SATURATION: 97 %

## 2025-04-24 DIAGNOSIS — S83.252D BUCKET-HANDLE TEAR OF LATERAL MENISCUS OF LEFT KNEE AS CURRENT INJURY, SUBSEQUENT ENCOUNTER: Primary | ICD-10-CM

## 2025-04-24 PROCEDURE — 99214 OFFICE O/P EST MOD 30 MIN: CPT | Performed by: STUDENT IN AN ORGANIZED HEALTH CARE EDUCATION/TRAINING PROGRAM

## 2025-04-24 NOTE — PROGRESS NOTES
for his knee including nonoperative and operative intervention.  Given the tear pattern and displacement I recommend operative intervention.  We discussed treatment options for his knee including a lateral meniscus repair versus partial lateral meniscectomy.  Upon talking further with him it sounds like he had the same injury on his right knee many years ago that was treated with a subtotal partial meniscectomy.  He states that he is developing arthritis in that knee already.  I had a discussion with him regarding meniscus repair versus partial meniscectomy.  Given his age and no significant arthritis throughout his knee, a meniscal repair would be indicated as a possibility.  However we discussed the limited weightbearing status, postoperative bracing, and extensive rehabilitation/physical therapy associated with a meniscal repair.  The patient states that he will not be able to comply with the weightbearing restrictions and limitations postoperatively after repair.  Therefore even though this tear pattern may be amenable to repair, the patient has elected to instead favor a partial lateral meniscectomy.  We discussed that this is a much quicker recovery, however he may not have much meniscus remaining after a partial lateral meniscectomy.  We discussed the risk of early arthritis.  His goal is to get back to work as quick as possible after the surgery and have the shortest recovery time possible.  We discussed the surgical procedure in detail as well as the postoperative course, rehabilitation, expected outcomes, and risk/complications. Surgery will be done under general anesthesia with local anesthetic.  Postoperatively after partial meniscectomy he will be weightbearing as tolerated.  He will not have any limitations in his knee range of motion and will begin physical therapy within 3 days after surgery.  No brace will be required. The expected length of recovery for this surgery is approximately 2 to 3 months.

## 2025-04-30 ENCOUNTER — TELEPHONE (OUTPATIENT)
Age: 45
End: 2025-04-30

## 2025-04-30 ENCOUNTER — PREP FOR PROCEDURE (OUTPATIENT)
Age: 45
End: 2025-04-30

## 2025-04-30 PROBLEM — S83.252A BUCKET-HANDLE TEAR OF LATERAL MENISCUS OF LEFT KNEE AS CURRENT INJURY: Status: ACTIVE | Noted: 2025-04-30

## 2025-04-30 NOTE — TELEPHONE ENCOUNTER
Called Pt to inform of surgery details and Pre and Post Op appt dates and times. PT requested an update on the [employer] message he sent in for pain management between now and surgery date.

## 2025-05-28 ENCOUNTER — OFFICE VISIT (OUTPATIENT)
Age: 45
End: 2025-05-28
Payer: MEDICARE

## 2025-05-28 VITALS
WEIGHT: 200 LBS | TEMPERATURE: 96.8 F | OXYGEN SATURATION: 99 % | HEIGHT: 73 IN | HEART RATE: 65 BPM | DIASTOLIC BLOOD PRESSURE: 70 MMHG | BODY MASS INDEX: 26.51 KG/M2 | SYSTOLIC BLOOD PRESSURE: 120 MMHG

## 2025-05-28 DIAGNOSIS — Z01.818 PREOP EXAMINATION: Primary | ICD-10-CM

## 2025-05-28 PROCEDURE — 99213 OFFICE O/P EST LOW 20 MIN: CPT | Performed by: PHYSICIAN ASSISTANT

## 2025-05-28 RX ORDER — SODIUM CHLORIDE 0.9 % (FLUSH) 0.9 %
5-40 SYRINGE (ML) INJECTION EVERY 12 HOURS SCHEDULED
OUTPATIENT
Start: 2025-05-28

## 2025-05-28 RX ORDER — CHLORHEXIDINE GLUCONATE 40 MG/ML
SOLUTION TOPICAL
Qty: 118 ML | Refills: 0 | Status: SHIPPED | OUTPATIENT
Start: 2025-05-28

## 2025-05-28 RX ORDER — SODIUM CHLORIDE 9 MG/ML
INJECTION, SOLUTION INTRAVENOUS PRN
OUTPATIENT
Start: 2025-05-28

## 2025-05-28 RX ORDER — SODIUM CHLORIDE, SODIUM LACTATE, POTASSIUM CHLORIDE, CALCIUM CHLORIDE 600; 310; 30; 20 MG/100ML; MG/100ML; MG/100ML; MG/100ML
INJECTION, SOLUTION INTRAVENOUS CONTINUOUS
OUTPATIENT
Start: 2025-05-28

## 2025-05-28 RX ORDER — SODIUM CHLORIDE 0.9 % (FLUSH) 0.9 %
5-40 SYRINGE (ML) INJECTION PRN
OUTPATIENT
Start: 2025-05-28

## 2025-05-28 NOTE — H&P (VIEW-ONLY)
Subjective:     Patient is a 44 y.o.  male presented with a history of left knee pain.  Onset of symptoms was abrupt 3 months ago with gradually worsening course since that time. He is being admitted for surgical management of this condition. The indications for the procedure include MRI scan showing bucket-handle tear lateral meniscus.    Patient Active Problem List    Diagnosis Date Noted    Bucket-handle tear of lateral meniscus of left knee as current injury 2025    Parathyroid adenoma 03/15/2024    Lumbar degenerative disc disease 2017    Lumbar radicular pain 2017    Toxic effect of tobacco cigarette, unintentional 2013    Legal blindness 2012    Bilateral optic neuropathy 2012     Past Medical History:   Diagnosis Date    Anxiety     Bipolar disorder (HCC)     Chronic back pain     Depression     Hypertension     Lumbar degenerative disc disease 2017    Lumbar radicular pain     Neuropathy       Past Surgical History:   Procedure Laterality Date    BACK SURGERY N/A     DENTAL SURGERY      all    KNEE ARTHROSCOPY      rt knee    KNEE SURGERY        Not in a hospital admission.  No Known Allergies   Social History     Tobacco Use    Smoking status: Former     Current packs/day: 0.00     Types: Cigarettes     Start date: 2012     Quit date:      Years since quittin.4     Passive exposure: Past    Smokeless tobacco: Never    Tobacco comments:     smokes marijuana every day   Substance Use Topics    Alcohol use: Not Currently     Comment: 40-48 oz malt liquor/day      Family History   Problem Relation Age of Onset    Arthritis Mother     High Blood Pressure Mother     Diabetes Father     High Blood Pressure Father     Cancer Father         Pancreatic cancer    Arthritis Father     High Cholesterol Father     Colon Cancer Maternal Aunt     Mental Illness Paternal Aunt       Review of Systems  A comprehensive review of systems was

## 2025-06-03 ENCOUNTER — ANESTHESIA EVENT (OUTPATIENT)
Dept: OPERATING ROOM | Age: 45
End: 2025-06-03
Payer: MEDICARE

## 2025-06-04 ENCOUNTER — ANESTHESIA (OUTPATIENT)
Dept: OPERATING ROOM | Age: 45
End: 2025-06-04
Payer: MEDICARE

## 2025-06-04 ENCOUNTER — HOSPITAL ENCOUNTER (OUTPATIENT)
Age: 45
Setting detail: OUTPATIENT SURGERY
Discharge: HOME | End: 2025-06-04
Attending: STUDENT IN AN ORGANIZED HEALTH CARE EDUCATION/TRAINING PROGRAM | Admitting: STUDENT IN AN ORGANIZED HEALTH CARE EDUCATION/TRAINING PROGRAM
Payer: MEDICARE

## 2025-06-04 VITALS
TEMPERATURE: 97.2 F | OXYGEN SATURATION: 97 % | HEART RATE: 81 BPM | DIASTOLIC BLOOD PRESSURE: 75 MMHG | HEIGHT: 73 IN | SYSTOLIC BLOOD PRESSURE: 129 MMHG | RESPIRATION RATE: 16 BRPM | BODY MASS INDEX: 27.17 KG/M2 | WEIGHT: 205 LBS

## 2025-06-04 DIAGNOSIS — S83.252D BUCKET-HANDLE TEAR OF LATERAL MENISCUS OF LEFT KNEE AS CURRENT INJURY, SUBSEQUENT ENCOUNTER: Primary | ICD-10-CM

## 2025-06-04 PROCEDURE — 6370000000 HC RX 637 (ALT 250 FOR IP): Performed by: STUDENT IN AN ORGANIZED HEALTH CARE EDUCATION/TRAINING PROGRAM

## 2025-06-04 PROCEDURE — 3700000001 HC ADD 15 MINUTES (ANESTHESIA): Performed by: STUDENT IN AN ORGANIZED HEALTH CARE EDUCATION/TRAINING PROGRAM

## 2025-06-04 PROCEDURE — 7100000001 HC PACU RECOVERY - ADDTL 15 MIN: Performed by: STUDENT IN AN ORGANIZED HEALTH CARE EDUCATION/TRAINING PROGRAM

## 2025-06-04 PROCEDURE — 6360000002 HC RX W HCPCS: Performed by: ANESTHESIOLOGIST ASSISTANT

## 2025-06-04 PROCEDURE — 2500000003 HC RX 250 WO HCPCS: Performed by: PHYSICIAN ASSISTANT

## 2025-06-04 PROCEDURE — 2580000003 HC RX 258: Performed by: PHYSICIAN ASSISTANT

## 2025-06-04 PROCEDURE — 3700000000 HC ANESTHESIA ATTENDED CARE: Performed by: STUDENT IN AN ORGANIZED HEALTH CARE EDUCATION/TRAINING PROGRAM

## 2025-06-04 PROCEDURE — 3600000014 HC SURGERY LEVEL 4 ADDTL 15MIN: Performed by: STUDENT IN AN ORGANIZED HEALTH CARE EDUCATION/TRAINING PROGRAM

## 2025-06-04 PROCEDURE — 2709999900 HC NON-CHARGEABLE SUPPLY: Performed by: STUDENT IN AN ORGANIZED HEALTH CARE EDUCATION/TRAINING PROGRAM

## 2025-06-04 PROCEDURE — 3600000004 HC SURGERY LEVEL 4 BASE: Performed by: STUDENT IN AN ORGANIZED HEALTH CARE EDUCATION/TRAINING PROGRAM

## 2025-06-04 PROCEDURE — 7100000000 HC PACU RECOVERY - FIRST 15 MIN: Performed by: STUDENT IN AN ORGANIZED HEALTH CARE EDUCATION/TRAINING PROGRAM

## 2025-06-04 PROCEDURE — 6360000002 HC RX W HCPCS: Performed by: PHYSICIAN ASSISTANT

## 2025-06-04 PROCEDURE — 2500000003 HC RX 250 WO HCPCS: Performed by: STUDENT IN AN ORGANIZED HEALTH CARE EDUCATION/TRAINING PROGRAM

## 2025-06-04 PROCEDURE — 6360000002 HC RX W HCPCS

## 2025-06-04 PROCEDURE — 7100000011 HC PHASE II RECOVERY - ADDTL 15 MIN: Performed by: STUDENT IN AN ORGANIZED HEALTH CARE EDUCATION/TRAINING PROGRAM

## 2025-06-04 PROCEDURE — 7100000010 HC PHASE II RECOVERY - FIRST 15 MIN: Performed by: STUDENT IN AN ORGANIZED HEALTH CARE EDUCATION/TRAINING PROGRAM

## 2025-06-04 PROCEDURE — 6360000002 HC RX W HCPCS: Performed by: STUDENT IN AN ORGANIZED HEALTH CARE EDUCATION/TRAINING PROGRAM

## 2025-06-04 RX ORDER — SODIUM CHLORIDE 9 MG/ML
INJECTION, SOLUTION INTRAVENOUS PRN
Status: DISCONTINUED | OUTPATIENT
Start: 2025-06-04 | End: 2025-06-04 | Stop reason: HOSPADM

## 2025-06-04 RX ORDER — FENTANYL CITRATE 0.05 MG/ML
50 INJECTION, SOLUTION INTRAMUSCULAR; INTRAVENOUS EVERY 5 MIN PRN
Status: DISCONTINUED | OUTPATIENT
Start: 2025-06-04 | End: 2025-06-04 | Stop reason: HOSPADM

## 2025-06-04 RX ORDER — PROPOFOL 10 MG/ML
INJECTION, EMULSION INTRAVENOUS
Status: DISCONTINUED | OUTPATIENT
Start: 2025-06-04 | End: 2025-06-04 | Stop reason: SDUPTHER

## 2025-06-04 RX ORDER — MEPERIDINE HYDROCHLORIDE 25 MG/ML
12.5 INJECTION INTRAMUSCULAR; INTRAVENOUS; SUBCUTANEOUS EVERY 5 MIN PRN
Status: DISCONTINUED | OUTPATIENT
Start: 2025-06-04 | End: 2025-06-04 | Stop reason: HOSPADM

## 2025-06-04 RX ORDER — SODIUM CHLORIDE 0.9 % (FLUSH) 0.9 %
5-40 SYRINGE (ML) INJECTION PRN
Status: DISCONTINUED | OUTPATIENT
Start: 2025-06-04 | End: 2025-06-04 | Stop reason: HOSPADM

## 2025-06-04 RX ORDER — FENTANYL CITRATE 0.05 MG/ML
25 INJECTION, SOLUTION INTRAMUSCULAR; INTRAVENOUS EVERY 5 MIN PRN
Status: DISCONTINUED | OUTPATIENT
Start: 2025-06-04 | End: 2025-06-04 | Stop reason: HOSPADM

## 2025-06-04 RX ORDER — DEXAMETHASONE SODIUM PHOSPHATE 4 MG/ML
INJECTION, SOLUTION INTRA-ARTICULAR; INTRALESIONAL; INTRAMUSCULAR; INTRAVENOUS; SOFT TISSUE
Status: DISCONTINUED | OUTPATIENT
Start: 2025-06-04 | End: 2025-06-04 | Stop reason: SDUPTHER

## 2025-06-04 RX ORDER — SODIUM CHLORIDE, SODIUM LACTATE, POTASSIUM CHLORIDE, CALCIUM CHLORIDE 600; 310; 30; 20 MG/100ML; MG/100ML; MG/100ML; MG/100ML
INJECTION, SOLUTION INTRAVENOUS CONTINUOUS
Status: DISCONTINUED | OUTPATIENT
Start: 2025-06-04 | End: 2025-06-04 | Stop reason: HOSPADM

## 2025-06-04 RX ORDER — ASPIRIN 325 MG
325 TABLET ORAL DAILY
Qty: 14 TABLET | Refills: 0 | Status: SHIPPED | OUTPATIENT
Start: 2025-06-04 | End: 2025-06-18

## 2025-06-04 RX ORDER — OXYCODONE HYDROCHLORIDE 5 MG/1
10 TABLET ORAL PRN
Status: COMPLETED | OUTPATIENT
Start: 2025-06-04 | End: 2025-06-04

## 2025-06-04 RX ORDER — PROCHLORPERAZINE EDISYLATE 5 MG/ML
5 INJECTION INTRAMUSCULAR; INTRAVENOUS
Status: DISCONTINUED | OUTPATIENT
Start: 2025-06-04 | End: 2025-06-04 | Stop reason: HOSPADM

## 2025-06-04 RX ORDER — FENTANYL CITRATE 50 UG/ML
INJECTION, SOLUTION INTRAMUSCULAR; INTRAVENOUS
Status: DISCONTINUED | OUTPATIENT
Start: 2025-06-04 | End: 2025-06-04 | Stop reason: SDUPTHER

## 2025-06-04 RX ORDER — LABETALOL HYDROCHLORIDE 5 MG/ML
10 INJECTION, SOLUTION INTRAVENOUS
Status: DISCONTINUED | OUTPATIENT
Start: 2025-06-04 | End: 2025-06-04 | Stop reason: HOSPADM

## 2025-06-04 RX ORDER — LIDOCAINE HYDROCHLORIDE 10 MG/ML
INJECTION, SOLUTION EPIDURAL; INFILTRATION; INTRACAUDAL; PERINEURAL
Status: DISCONTINUED | OUTPATIENT
Start: 2025-06-04 | End: 2025-06-04 | Stop reason: SDUPTHER

## 2025-06-04 RX ORDER — SODIUM CHLORIDE 0.9 % (FLUSH) 0.9 %
5-40 SYRINGE (ML) INJECTION EVERY 12 HOURS SCHEDULED
Status: DISCONTINUED | OUTPATIENT
Start: 2025-06-04 | End: 2025-06-04 | Stop reason: HOSPADM

## 2025-06-04 RX ORDER — DIPHENHYDRAMINE HYDROCHLORIDE 50 MG/ML
12.5 INJECTION, SOLUTION INTRAMUSCULAR; INTRAVENOUS
Status: DISCONTINUED | OUTPATIENT
Start: 2025-06-04 | End: 2025-06-04 | Stop reason: HOSPADM

## 2025-06-04 RX ORDER — OXYCODONE HYDROCHLORIDE 5 MG/1
5 TABLET ORAL PRN
Status: COMPLETED | OUTPATIENT
Start: 2025-06-04 | End: 2025-06-04

## 2025-06-04 RX ORDER — MAGNESIUM HYDROXIDE 1200 MG/15ML
LIQUID ORAL CONTINUOUS PRN
Status: DISCONTINUED | OUTPATIENT
Start: 2025-06-04 | End: 2025-06-04 | Stop reason: HOSPADM

## 2025-06-04 RX ORDER — LIDOCAINE HYDROCHLORIDE 10 MG/ML
INJECTION, SOLUTION EPIDURAL; INFILTRATION; INTRACAUDAL; PERINEURAL PRN
Status: DISCONTINUED | OUTPATIENT
Start: 2025-06-04 | End: 2025-06-04 | Stop reason: HOSPADM

## 2025-06-04 RX ORDER — HYDRALAZINE HYDROCHLORIDE 20 MG/ML
10 INJECTION INTRAMUSCULAR; INTRAVENOUS
Status: DISCONTINUED | OUTPATIENT
Start: 2025-06-04 | End: 2025-06-04 | Stop reason: HOSPADM

## 2025-06-04 RX ORDER — NALOXONE HYDROCHLORIDE 0.4 MG/ML
INJECTION, SOLUTION INTRAMUSCULAR; INTRAVENOUS; SUBCUTANEOUS PRN
Status: DISCONTINUED | OUTPATIENT
Start: 2025-06-04 | End: 2025-06-04 | Stop reason: HOSPADM

## 2025-06-04 RX ORDER — ONDANSETRON 2 MG/ML
INJECTION INTRAMUSCULAR; INTRAVENOUS
Status: DISCONTINUED | OUTPATIENT
Start: 2025-06-04 | End: 2025-06-04 | Stop reason: SDUPTHER

## 2025-06-04 RX ORDER — OXYCODONE AND ACETAMINOPHEN 5; 325 MG/1; MG/1
1 TABLET ORAL EVERY 6 HOURS PRN
Qty: 20 TABLET | Refills: 0 | Status: SHIPPED | OUTPATIENT
Start: 2025-06-04 | End: 2025-06-09

## 2025-06-04 RX ORDER — MIDAZOLAM HYDROCHLORIDE 1 MG/ML
INJECTION, SOLUTION INTRAMUSCULAR; INTRAVENOUS
Status: DISCONTINUED | OUTPATIENT
Start: 2025-06-04 | End: 2025-06-04 | Stop reason: SDUPTHER

## 2025-06-04 RX ORDER — ONDANSETRON 2 MG/ML
4 INJECTION INTRAMUSCULAR; INTRAVENOUS
Status: DISCONTINUED | OUTPATIENT
Start: 2025-06-04 | End: 2025-06-04 | Stop reason: HOSPADM

## 2025-06-04 RX ADMIN — ONDANSETRON 4 MG: 2 INJECTION, SOLUTION INTRAMUSCULAR; INTRAVENOUS at 13:48

## 2025-06-04 RX ADMIN — MIDAZOLAM HYDROCHLORIDE 2 MG: 1 INJECTION, SOLUTION INTRAMUSCULAR; INTRAVENOUS at 12:23

## 2025-06-04 RX ADMIN — FENTANYL CITRATE 25 MCG: 50 INJECTION, SOLUTION INTRAMUSCULAR; INTRAVENOUS at 13:50

## 2025-06-04 RX ADMIN — CEFAZOLIN 2000 MG: 2 INJECTION, POWDER, FOR SOLUTION INTRAMUSCULAR; INTRAVENOUS at 12:30

## 2025-06-04 RX ADMIN — LIDOCAINE HYDROCHLORIDE 50 MG: 10 INJECTION, SOLUTION EPIDURAL; INFILTRATION; INTRACAUDAL; PERINEURAL at 12:30

## 2025-06-04 RX ADMIN — OXYCODONE 5 MG: 5 TABLET ORAL at 15:02

## 2025-06-04 RX ADMIN — SODIUM CHLORIDE, SODIUM LACTATE, POTASSIUM CHLORIDE, AND CALCIUM CHLORIDE 1000 ML: .6; .31; .03; .02 INJECTION, SOLUTION INTRAVENOUS at 11:04

## 2025-06-04 RX ADMIN — FENTANYL CITRATE 50 MCG: 50 INJECTION, SOLUTION INTRAMUSCULAR; INTRAVENOUS at 12:30

## 2025-06-04 RX ADMIN — PROPOFOL 200 MG: 10 INJECTION, EMULSION INTRAVENOUS at 12:30

## 2025-06-04 RX ADMIN — FENTANYL CITRATE 25 MCG: 50 INJECTION, SOLUTION INTRAMUSCULAR; INTRAVENOUS at 13:02

## 2025-06-04 RX ADMIN — DEXAMETHASONE SODIUM PHOSPHATE 4 MG: 4 INJECTION INTRA-ARTICULAR; INTRALESIONAL; INTRAMUSCULAR; INTRAVENOUS; SOFT TISSUE at 12:30

## 2025-06-04 ASSESSMENT — PAIN SCALES - GENERAL
PAINLEVEL_OUTOF10: 8
PAINLEVEL_OUTOF10: 8
PAINLEVEL_OUTOF10: 0
PAINLEVEL_OUTOF10: 5

## 2025-06-04 NOTE — OP NOTE
Operative Note      Patient: Duncan Tripathi  YOB: 1980  MRN: [id number]    Date of Procedure: 6/4/2025    Pre-Op Diagnosis:  Left knee lateral meniscus tear, bucket-handle    Post-Op Diagnosis: Same       Procedure(s): Left knee arthroscopic partial lateral meniscectomy    Surgeon(s):  Pita Noyola MD    Assistant:   Physician Assistant: Steven Cruz PA-C  A skilled physicians assistant was required for this case.  He assisted with patient positioning, wound closure, and dressing application.    Anesthesia: General    Estimated Blood Loss (mL): 20 cc    Complications: None    Specimens: None    Implants: None      Drains: None    Findings:  Infection Present At Time Of Surgery (PATOS) (choose all levels that have infection present):  No infection present  Other Findings: n/a    Indications: 44 year-old male who injured his left knee approximately 2.5 months ago when his dog stepped on the outside of his knee.  He was found to have a bucket-handle tear of the lateral meniscus on MRI.       Findings: Large bucket-handle tear of the lateral meniscus extending from the root to the mid body, low-grade chondromalacia of the lateral compartment and lateral patellar facet     Detailed Description of Procedure:   Informed consent was obtained.  A surgical site luisa was placed onto the operative left knee.  The patient was then taken to the operating room and placed supine on the operating room table.  Antibiotics were given for surgical prophylaxis.  The patient was then placed under general anesthesia by the anesthesia staff.  After adequate anesthesia, the patient was positioned appropriately on the table.  A tourniquet was placed high up on the thigh but was not inflated for the case.  The leg was placed in a circumferential leg belcher.  The left lower extremity was then prepped and draped in normal sterile fashion with the use of ChloraPrep.     A timeout was performed to identify the correct

## 2025-06-04 NOTE — INTERVAL H&P NOTE
Update History & Physical    The patient's History and Physical of May 28, 2025 was reviewed with the patient and I examined the patient. There was no change. The surgical site was confirmed by the patient and me.     Plan: The risks, benefits, expected outcome, and alternative to the recommended procedure have been discussed with the patient. Patient understands and wants to proceed with the procedure.     Electronically signed by Pita Noyola MD on 6/4/2025 at 11:42 AM

## 2025-06-04 NOTE — ANESTHESIA PRE PROCEDURE
Department of Anesthesiology  Preprocedure Note       Name:  Duncan Tripathi   Age:  44 y.o.  :  1980                                          MRN:  [id number]         Date:  2025      Surgeon: Surgeon(s):  Pita Noyola MD    Procedure: Procedure(s):  Left Knee Arthroscopic lateral meniscus repair versus partial meniscectomy    Medications prior to admission:   Prior to Admission medications    Medication Sig Start Date End Date Taking? Authorizing Provider   oxyCODONE-acetaminophen (PERCOCET) 5-325 MG per tablet Take 1 tablet by mouth every 6 hours as needed for Pain for up to 5 days. Intended supply: 3 days. Take lowest dose possible to manage pain Max Daily Amount: 4 tablets 25 Yes Pita Noyola MD   aspirin (JAMA ASPIRIN) 325 MG tablet Take 1 tablet by mouth daily for 14 days 25 Yes Pita Noyola MD   chlorhexidine gluconate (HIBICLENS) 4 % SOLN external solution Use daily for 3 days prior to surgery 25  Yes Milton Chacon, PA   ketorolac (TORADOL) 10 MG tablet Take 1 tablet by mouth every 6 hours as needed for Pain 3/21/25  Yes Latoya Hernandez, NP-C   omeprazole (PRILOSEC) 20 MG delayed release capsule Take 1 capsule by mouth every morning (before breakfast) 25  Yes Ozzie Ruelas MD   ondansetron (ZOFRAN-ODT) 4 MG disintegrating tablet Take 1 tablet by mouth every 8 hours as needed for Nausea or Vomiting 25  Yes Aviva Diaz, APRN - CNP   atomoxetine (STRATTERA) 40 MG capsule Take 1 capsule by mouth daily 25  Yes Ozzie Ruelas MD   PARoxetine (PAXIL) 40 MG tablet Take 1 tablet by mouth every morning 12/3/24  Yes Ozzie Ruelas MD   vitamin D3 (CHOLECALCIFEROL) 125 MCG (5000 UT) TABS tablet Take 1 tablet by mouth daily   Yes Provider, MD Ethan   B Complex-C (SUPER B COMPLEX PO)    Yes Provider, MD Ethan   Elastic Bandages & Supports (NEOPRENE TENNIS ELBOW SLEEVE) MISC 1 each by Does not apply route daily  Patient not taking:

## 2025-06-04 NOTE — ANESTHESIA POSTPROCEDURE EVALUATION
Department of Anesthesiology  Postprocedure Note    Patient: Duncan Tripathi  MRN: [id number]  YOB: 1980  Date of evaluation: 6/4/2025    Procedure Summary       Date: 06/04/25 Room / Location: [address]    Anesthesia Start: 1226 Anesthesia Stop: 1403    Procedure: Left Knee Arthroscopic lateral partial meniscectomy (Left: Knee) Diagnosis:       Bucket-handle tear of lateral meniscus of left knee as current injury, subsequent encounter      (Bucket-handle tear of lateral meniscus of left knee as current injury, subsequent encounter [S83.252D])    Surgeons: Pita Noyola MD Responsible Provider: Tyler Clark MD    Anesthesia Type: general ASA Status: 2            Anesthesia Type: No value filed.    Estelita Phase I:      Estelita Phase II:      Anesthesia Post Evaluation    Patient location during evaluation: bedside  Patient participation: complete - patient participated  Level of consciousness: awake and awake and alert  Airway patency: patent  Nausea & Vomiting: no nausea and no vomiting  Cardiovascular status: blood pressure returned to baseline and hemodynamically stable  Respiratory status: acceptable  Hydration status: euvolemic  Pain management: adequate        No notable events documented.

## 2025-06-04 NOTE — DISCHARGE INSTRUCTIONS
KNEE ARTHROSCOPY POST-OP INSTRUCTIONS   Dr. Pita Noyola      YOUR PROCEDURE WAS: Left knee arthroscopic partial lateral meniscectomy    You are weight-bearing as tolerated, but advised to use crutches initially to off-load the knee.  You may progress weight and wean off of the crutches as you are comfortable and able.    Please follow these instructions carefully. If you have any questions, please contact a member of Dr. Noyola's team.      FOLLOW-UP APPOINTMENT  We would like to see you for a post-operative visit at: 2 weeks, 6 weeks, and 16 weeks (4 months) after your procedure. If you have not made your post-operative appointments, please call Dr. Noyola's office to schedule your appointment.      BRACE  A brace is not required for this procedure.     DRESSING CHANGES   Your knee was dressed in the sterile environment in the operating room.  The incisions were closed with absorbable sutures that do NOT need to be removed.  The dressing can be removed on the third day after surgery.   After you remove the bulky dressing (ACE wrap and soft cotton roll) you will see the sterile dressing  Please keep the steri-stips in place for 10-14 days after surgery, they will either fall off or can be removed by you after 10-14 days.  If they fall off prior to that time, simply keep the incision areas clean and covered with waterproof bandaids  On a daily basis, evaluate the incision for drainage, redness surrounding the incision or red streaks. These combined with increasing pain and fever (Temp greater than 101 degrees) can be signs of infection - please notify our office right away                                                                                          MEDICATIONS  You may resume your regular medicines after surgery.  We often prescribe narcotic pain medications to aid in controlling post-operative pain - such as Oxycodone or Norco. These medications may not alleviate ALL of your discomfort, but should help

## 2025-06-18 ENCOUNTER — HOSPITAL ENCOUNTER (OUTPATIENT)
Dept: PHYSICAL THERAPY | Age: 45
Setting detail: THERAPIES SERIES
Discharge: HOME | End: 2025-06-18
Payer: MEDICARE

## 2025-06-18 PROCEDURE — 97110 THERAPEUTIC EXERCISES: CPT

## 2025-06-18 PROCEDURE — 97162 PT EVAL MOD COMPLEX 30 MIN: CPT

## 2025-06-18 PROCEDURE — 97530 THERAPEUTIC ACTIVITIES: CPT

## 2025-06-18 ASSESSMENT — PAIN DESCRIPTION - PAIN TYPE: TYPE: SURGICAL PAIN

## 2025-06-18 ASSESSMENT — PAIN DESCRIPTION - DESCRIPTORS: DESCRIPTORS: ACHING;DISCOMFORT

## 2025-06-18 ASSESSMENT — PAIN DESCRIPTION - ORIENTATION: ORIENTATION: LEFT;OUTER

## 2025-06-18 ASSESSMENT — PAIN SCALES - GENERAL: PAINLEVEL_OUTOF10: 4

## 2025-06-18 ASSESSMENT — PAIN DESCRIPTION - LOCATION: LOCATION: KNEE

## 2025-06-18 NOTE — PROGRESS NOTES
New   Inc B knee AROM extension by >= 3 deg New                                                 Long Term Goals Completed by 4-5 weeks or 10 visits Goal Status   Increase AROM B knees to L >= 5-135 and R >=  New   Increase strength in L knee and hip to 5/5 all true plane measurements, avaialble ROM New   6 minute walk test >= 1000ft with pain post in L knee <= 1/10 per pt reoprt New   Decrease modified LEFS from 36% disability to <= 20% to show better function and less pain in L knee arthroscopy joint New   Pt competent advanced HEP L knee for discharge New   full flight of stairs reciprical with one rail  and pain post <= 2/10 New                                TREATMENT PLAN            Pt. actively involved in establishing Plan of Care and Goals: Yes  Patient/ Caregiver education and instruction: Goals, PT Role, Plan of Care, Evaluative findings, Home Exercise Program    KT tape        Treatment may include any combination of the following: Current Treatment Recommendations: Strengthening, ROM, Functional mobility training, Gait training, Stair training, Pain management, Home exercise program, Therapeutic activities, Modalities  Modalities: Heat/Cold     Frequency / Duration:  Patient to be seen 1-2x wk for 4-5 wks or 10 visits weeks      Eval Complexity:  moderate       PT Treatment Completed:  Exercises:  Therapeutic exercise (CPT 92202)   Treatment Reasoning    Exercise 1: L SLR 2-3 hold and slow lower x 10 reps  Exercise 2: prone alt hip ext 1-2 hold x 10 reps each leg, pillow at belly if needed  Exercise 3: scissor bridge 1-2 hold each position x 10 reps ( 2 sets of 5 reps if inc back c/o)  Exercise 4: tall sit hamstring stretch 30 sec x 2 each leg, max vc for technique  Exercise 5: 5 sit to stand to sit from mat table 24.1 sec using LLE ( surgical) > RLE  Exercise 6: backwards walking 50ft x 2    Limitations addressed: Mobility, Strength, Flexibility, Activity tolerance, Pain modulation,

## 2025-06-25 ENCOUNTER — HOSPITAL ENCOUNTER (OUTPATIENT)
Dept: PHYSICAL THERAPY | Age: 45
Setting detail: THERAPIES SERIES
Discharge: HOME | End: 2025-06-25
Payer: MEDICARE

## 2025-06-25 PROCEDURE — 97140 MANUAL THERAPY 1/> REGIONS: CPT

## 2025-06-25 PROCEDURE — 97110 THERAPEUTIC EXERCISES: CPT

## 2025-06-25 ASSESSMENT — PAIN DESCRIPTION - LOCATION: LOCATION: KNEE

## 2025-06-25 ASSESSMENT — PAIN SCALES - GENERAL: PAINLEVEL_OUTOF10: 4

## 2025-06-25 ASSESSMENT — PAIN DESCRIPTION - PAIN TYPE: TYPE: SURGICAL PAIN

## 2025-06-25 ASSESSMENT — PAIN DESCRIPTION - ORIENTATION: ORIENTATION: LEFT;OUTER

## 2025-06-25 NOTE — PROGRESS NOTES
Physical Therapy  Physical Therapy: Daily Note   Patient: Duncan Tripathi (44 y.o. male)   Examination Date: 2025  Plan of Care/Certification Expiration Date: 25    No data recorded   :  1980 # of Visits since SOC:   2   MRN: [id number]  CSN: [id number] Start of Care Date:   2025   Insurance: Payor: [facility]-MEDICARE ADVANTAGE / [facility] / Product Type: *No Product type* /   Insurance ID: [id number] - (Medicare Managed) Secondary Insurance (if applicable):    Referring Physician: Pita Noyola MD Dr Rachelle Metz   PCP: Ozzie Ruelas MD Visits to Date/Visits Approved: 2 / 10    No Show/Cancelled Appts: 0  0     Medical Diagnosis: No admission diagnoses are documented for this encounter.    Treatment Diagnosis: pain, dec ROM and diff walking post L knee meniscal tear and arthroscopy        SUBJECTIVE EXAMINATION   Pain Level: Pain Screening  Patient Currently in Pain: Yes  Pain Assessment: 0-10  Pain Level: 4  Pain Type: Surgical pain  Pain Location: Knee  Pain Orientation: Left, Outer    Patient Comments: Subjective: Pt. states pain on average 4-5/10.  Pt. states has been at work 1-2 weeks.  Pt. reports working 9-10 hour days.    HEP Compliance: Good        OBJECTIVE EXAMINATION   Restrictions:  No data recorded No data recorded No data recorded          Left AROM  Right AROM      AROM LLE (degrees)  L Knee Extension (0): 5 deg from neutral post manual therapy and ABIGAIL AROM RLE (degrees)  R Knee Extension (0): 12 deg from neutral post manual therapy and nuetral       TREATMENT     Exercises:      Treatment Reasoning    Exercise 1: L SLR 2-3 hold and slow lower x 10 reps  Exercise 2: prone hip ext 2 x5 B VC for form  Exercise 3: scissor bridge 1-2 hold each position x 10 reps ( 2 sets of 5 reps if inc back c/o)  Exercise 7: supine hamstring stretch hold 30 sec x 3  Exercise 8: hip ABD hooklying BTB x 10 hold 5 sec  Exercise 9: sidlying hip ABD x 2 x 5    Limitations

## 2025-06-26 ENCOUNTER — OFFICE VISIT (OUTPATIENT)
Dept: ORTHOPEDIC SURGERY | Age: 45
End: 2025-06-26

## 2025-06-26 VITALS
BODY MASS INDEX: 27.17 KG/M2 | HEART RATE: 77 BPM | WEIGHT: 205 LBS | OXYGEN SATURATION: 97 % | TEMPERATURE: 97.8 F | HEIGHT: 73 IN

## 2025-06-26 DIAGNOSIS — S83.252D BUCKET-HANDLE TEAR OF LATERAL MENISCUS OF LEFT KNEE AS CURRENT INJURY, SUBSEQUENT ENCOUNTER: Primary | ICD-10-CM

## 2025-06-26 PROCEDURE — 99024 POSTOP FOLLOW-UP VISIT: CPT | Performed by: STUDENT IN AN ORGANIZED HEALTH CARE EDUCATION/TRAINING PROGRAM

## 2025-06-26 NOTE — PROGRESS NOTES
Orthopedic Surgery and Sports Medicine    Subjective:      Patient ID: Duncan Tripathi is a 44 y.o. male who presents today for:  Chief Complaint   Patient presents with    Post-Op Check     1st Post Op for Left Knee Arthroscopic Lateral Meniscus Repair Versus Partial Meniscectomy on 25. Pain today is 4-5/10. Pt is not taking any pain meds at this time.        OSCAR Song is a 44 year old male for post-op evaluation. He is 3 weeks status post left knee arthroscopic partial lateral subtotal meniscectomy. Date of surgery was 2025. Overall he is doing well. He has already returned to work. He has worked 10 hour shifts. Pain has been mild in the knee. He is not taking any pain medication. He has mild knee swelling. No complaints or concerns today.      Past Medical History:   Diagnosis Date    Anxiety     Bipolar disorder (HCC)     Chronic back pain     Depression     Hypertension     Lumbar degenerative disc disease 2017    Lumbar radicular pain     Neuropathy       Past Surgical History:   Procedure Laterality Date    BACK SURGERY N/A     DENTAL SURGERY      all    KNEE ARTHROSCOPY      rt knee    KNEE ARTHROSCOPY Left 2025    Left Knee Arthroscopy + partial lateral meniscectomy performed by Pita Noyola [place] [facility]    KNEE SURGERY       Social History     Socioeconomic History    Marital status: Single     Spouse name: Not on file    Number of children: Not on file    Years of education: Not on file    Highest education level: Not on file   Occupational History    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   Tobacco Use    Smoking status: Former     Current packs/day: 0.00     Types: Cigarettes     Start date: 2012     Quit date:      Years since quittin.4     Passive exposure: Past    Smokeless tobacco: Never    Tobacco comments:     smokes marijuana every day   Vaping Use    Vaping status: Never Used   Substance and Sexual Activity    Alcohol use: Not Currently     Comment:

## 2025-06-30 ENCOUNTER — HOSPITAL ENCOUNTER (OUTPATIENT)
Dept: PHYSICAL THERAPY | Age: 45
Setting detail: THERAPIES SERIES
Discharge: HOME | End: 2025-06-30
Payer: MEDICARE

## 2025-06-30 PROCEDURE — 97140 MANUAL THERAPY 1/> REGIONS: CPT

## 2025-06-30 PROCEDURE — 97035 APP MDLTY 1+ULTRASOUND EA 15: CPT

## 2025-06-30 PROCEDURE — 97110 THERAPEUTIC EXERCISES: CPT

## 2025-06-30 ASSESSMENT — PAIN SCALES - GENERAL: PAINLEVEL_OUTOF10: 5

## 2025-06-30 NOTE — PROGRESS NOTES
Physical Therapy: Daily Note   Patient: Duncan Tripathi (44 y.o. male)   Examination Date: 2025  Plan of Care/Certification Expiration Date: 25    No data recorded   :  1980 # of Visits since SOC:   3   MRN: [id number]  CSN: [id number] Start of Care Date:   2025   Insurance: Payor: [facility]-MEDICARE ADVANTAGE / Plan: [facility]-MEDICARE ADVANTAGE / Product Type: *No Product type* /   Insurance ID: [id number] - (Medicare Managed) Secondary Insurance (if applicable):    Referring Physician: Pita Noyola MD Dr Rachelle Metz   PCP: Ozzie Ruelas MD Visits to Date/Visits Approved: 3 / 10    No Show/Cancelled Appts: 0  0     Medical Diagnosis: No admission diagnoses are documented for this encounter.    Treatment Diagnosis: pain, dec ROM and diff walking post L knee meniscal tear and arthroscopy        SUBJECTIVE EXAMINATION   Pain Level: Pain Screening  Patient Currently in Pain: Yes  Pain Assessment: 0-10  Pain Level: 5 (L knee)    Patient Comments: Subjective: Pt states L knee 4-5/10 this date. 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    HEP Compliance: Poor          TREATMENT     Exercises:      Treatment Reasoning    Exercise 1: L SLR 5 hold and slow lower x 10 reps  Exercise 2: VMO SLR; H3'' x 10  Exercise 3: prone hip extensions H3'' x 10  Exercise 9: sidlying hip ABD x10 H3''    Limitations addressed: Mobility, Strength, Flexibility, Activity tolerance, Pain modulation, Posture                     Manual Therapy: (CPT 28619) Treatment Reasoning     Other: KT tape 4 V strips surrounding L knee to dec inflammation since pt is on his feet all day at work.  Patellar mobs inf/sup and med/lat, very tight Limitations addressed: Joint motion, Tissue extensibility                    Modalities  Ultrasound: (CPT 23261) Treatment Reasoning    Ultrasound location: Left, Posterior  Ultrasound specified location: posterior lateral knee  Ultrasound frequency: 1 MHz  Ultrasound

## 2025-07-06 DIAGNOSIS — F90.9 ATTENTION DEFICIT HYPERACTIVITY DISORDER (ADHD), UNSPECIFIED ADHD TYPE: ICD-10-CM

## 2025-07-06 NOTE — TELEPHONE ENCOUNTER
Comments:     Last Office Visit (last PCP visit):   2/27/2025    Next Visit Date:  Future Appointments   Date Time Provider Department Center   7/7/2025 10:15 AM Venita Gabriel PTA [facility]   7/10/2025 10:15 AM Kelly Moran PTA [facility]   8/7/2025  9:30 AM Vega, Pita M, MD [facility]         Rx requested:  Requested Prescriptions     Pending Prescriptions Disp Refills    atomoxetine (STRATTERA) 40 MG capsule [Pharmacy Med Name: Atomoxetine HCl 40 MG Oral Capsule] 90 capsule 0     Sig: Take 1 capsule by mouth once daily                    How Severe Are Your Spot(S)?: mild Have Your Spot(S) Been Treated In The Past?: has not been treated Hpi Title: Evaluation of Skin Lesions

## 2025-07-07 ENCOUNTER — HOSPITAL ENCOUNTER (OUTPATIENT)
Dept: PHYSICAL THERAPY | Age: 45
Setting detail: THERAPIES SERIES
Discharge: HOME | End: 2025-07-07
Payer: MEDICARE

## 2025-07-07 PROCEDURE — 97116 GAIT TRAINING THERAPY: CPT

## 2025-07-07 PROCEDURE — 97140 MANUAL THERAPY 1/> REGIONS: CPT

## 2025-07-07 PROCEDURE — 97110 THERAPEUTIC EXERCISES: CPT

## 2025-07-07 RX ORDER — ATOMOXETINE 40 MG/1
40 CAPSULE ORAL DAILY
Qty: 90 CAPSULE | Refills: 0 | Status: SHIPPED | OUTPATIENT
Start: 2025-07-07

## 2025-07-07 ASSESSMENT — PAIN SCALES - GENERAL: PAINLEVEL_OUTOF10: 3

## 2025-07-07 NOTE — PROGRESS NOTES
Physical Therapy  Physical Therapy: Daily Note   Patient: Duncan Tripathi (44 y.o. male)   Examination Date: 2025  Plan of Care/Certification Expiration Date: 25    No data recorded   :  1980 # of Visits since SOC:   4   MRN: [id number]  CSN: [id number] Start of Care Date:   2025   Insurance: Payor: [facility]MEDIC[facility]ANT[facility] / Product Type: *No Product type* /   Insurance ID: [id number] - (Medicare Managed) Secondary Insurance (if applicable):    Referring Physician: Pita Noyola MD Dr Rachelle Metz   PCP: Ozzie Ruelas MD Visits to Date/Visits Approved: 4 / 10    No Show/Cancelled Appts: 0  0     Medical Diagnosis: No admission diagnoses are documented for this encounter.    Treatment Diagnosis: pain, dec ROM and diff walking post L knee meniscal tear and arthroscopy        SUBJECTIVE EXAMINATION   Pain Level: Pain Screening  Patient Currently in Pain: Yes  Pain Assessment: 0-10  Pain Level: 3    Patient Comments: Subjective: Pt. states L knee is doing okay sometimes icing at work with longer hours.    HEP Compliance: Good        OBJECTIVE EXAMINATION   Restrictions:  No data recorded No data recorded No data recorded          Left AROM  Right AROM      AROM LLE (degrees)  L Knee Flexion (0-145): 1-130 deg         TREATMENT     Exercises:  Therapeutic exercise (CPT 45775)   Treatment Reasoning    Exercise 1: L SLR 5 hold and slow lower x 10 reps 1#  Exercise 2: VMO SLR; H3'' x 10 1#  Exercise 3: prone hip extensions H3'' x 10 1#  Exercise 7: supine hamstring stretch hold 30 sec x 3  Exercise 9: sidlying hip ABD x10 H3'' 1#  Exercise 10: standing L gastroc and soleus stretch hold 30 sec x 2 ea.  (shoe off)    Limitations addressed: Mobility, Strength, Flexibility, Activity tolerance, Pain modulation, Posture                     Gait: (CPT 99412)  Treatment Reasoning    Gait Training 1: 6 min walk test 1000' no inc in pain no AD B AFO's donned. VC for  “Patient's name, , procedure and correct site were confirmed during the [facility].”

## 2025-07-10 ENCOUNTER — HOSPITAL ENCOUNTER (OUTPATIENT)
Dept: PHYSICAL THERAPY | Age: 45
Setting detail: THERAPIES SERIES
Discharge: HOME | End: 2025-07-10
Payer: MEDICARE

## 2025-07-10 PROCEDURE — 97035 APP MDLTY 1+ULTRASOUND EA 15: CPT

## 2025-07-10 PROCEDURE — 97110 THERAPEUTIC EXERCISES: CPT

## 2025-07-10 NOTE — PROGRESS NOTES
Physical Therapy: Daily Note   Patient: Duncan Tripatih (44 y.o. male)   Examination Date: 07/10/2025  Plan of Care/Certification Expiration Date: 25    No data recorded   :  1980 # of Visits since SOC:   5   MRN: [id number]  CSN: [id number] Start of Care Date:   2025   Insurance: Payor: [facility]-MEDICARE ADVANTAGE / Plan: [facility]-MEDICARE ADVANTAGE / Product Type: *No Product type* /   Insurance ID: [id number] - (Medicare Managed) Secondary Insurance (if applicable):    Referring Physician: Pita Noyola MD Dr Rachelle Metz   PCP: Ozzie Ruelas MD Visits to Date/Visits Approved: 5 / 10    No Show/Cancelled Appts: 0 / 0     Medical Diagnosis: No admission diagnoses are documented for this encounter.    Treatment Diagnosis: pain, dec ROM and diff walking post L knee meniscal tear and arthroscopy        SUBJECTIVE EXAMINATION       Patient Comments: Subjective: Pt states he is not doing HEP. He reports pain 1-2/10.    HEP Compliance: Poor            TREATMENT     Exercises:      Treatment Reasoning    Exercise 1: L SLR 5 hold and slow lower 2x 10 reps 1# for first set and 0# 2nd set  Exercise 3: prone hip extensions H5'' x 10 1#  Exercise 7: supine hamstring stretch hold 30 sec x 3  Exercise 9: sidlying hip ABD x15 H5'' 1#  Exercise 11: L hip flexor stretch H30'' x 3    Limitations addressed: Mobility, Strength, Flexibility, Activity tolerance, Pain modulation, Posture                     Modalities  Ultrasound: (CPT 79587) Treatment Reasoning    Ultrasound location: Left, Lateral  Ultrasound specified location: lateral knee  Ultrasound frequency: 1 MHz  Ultrasound intensity (W/cm2): 1  Ultrasound mode: Pulsed (20%)  Ultrasound Parameters: x 10 min to inc circulation and promote healing Limitations addressed: Pain modulation                    Pt Education: Additional Comments: KT tape       ASSESSMENT     Assessment: Assessment: Pt tolerating inc reps of SLR this date in order to inc functional

## 2025-07-14 ENCOUNTER — HOSPITAL ENCOUNTER (OUTPATIENT)
Dept: PHYSICAL THERAPY | Age: 45
Setting detail: THERAPIES SERIES
Discharge: HOME | End: 2025-07-14
Payer: MEDICARE

## 2025-07-14 PROCEDURE — 97035 APP MDLTY 1+ULTRASOUND EA 15: CPT

## 2025-07-14 PROCEDURE — 97110 THERAPEUTIC EXERCISES: CPT

## 2025-07-14 PROCEDURE — 97140 MANUAL THERAPY 1/> REGIONS: CPT

## 2025-07-14 NOTE — PROGRESS NOTES
Physical Therapy: Daily Note   Patient: Duncan Tripathi (44 y.o. male)   Examination Date: 2025  Plan of Care/Certification Expiration Date: 25    No data recorded   :  1980 # of Visits since SOC:   6   MRN: [id number]  CSN: [id number] Start of Care Date:   2025   Insurance: Payor: [facility]-MEDICARE ADVANTAGE / Plan: [facility]-MEDICARE ADVANTAGE / Product Type: *No Product type* /   Insurance ID: [id number] - (Medicare Managed) Secondary Insurance (if applicable):    Referring Physician: Pita Noyola MD Dr Rachelle Metz   PCP: Ozzie Ruelas MD Visits to Date/Visits Approved: 6 / 10    No Show/Cancelled Appts: 0  0     Medical Diagnosis: No admission diagnoses are documented for this encounter.    Treatment Diagnosis: pain, dec ROM and diff walking post L knee meniscal tear and arthroscopy        SUBJECTIVE EXAMINATION       Patient Comments: Subjective: Pt states his knee feels achy, sometimes burning in his knee rated 1-2/10. Pt states he feels the pain the most when doing stairs. Pt states he wore compression sleeve    HEP Compliance: Poor        OBJECTIVE EXAMINATION   Restrictions:  No data recorded No data recorded No data recorded          Left AROM  Right AROM      AROM LLE (degrees)  L Knee Flexion (0-145): 0-135 deg         TREATMENT     Exercises:      Treatment Reasoning    Exercise 1: L SLR 5 hold and slow lower 2x 10 reps 1#  Exercise 3: prone hip extensions H5'' 2 x 10 1#  Exercise 9: sidlying hip ABD 2x10 H5'' 1#  Exercise 12: sidelying RLE hip adduction H3'' 2 x 10    Limitations addressed: Mobility, Strength, Flexibility, Activity tolerance, Pain modulation, Posture                     Manual Therapy: (CPT 44277) Treatment Reasoning     Other: KT tape 4 V strips to L knee 0% tension for dec inflammation for inc activity celestino Limitations addressed: Joint motion, Tissue extensibility                  Modalities        Ultrasound: (CPT 72482) Treatment Reasoning    Ultrasound

## 2025-07-18 ENCOUNTER — HOSPITAL ENCOUNTER (OUTPATIENT)
Dept: PHYSICAL THERAPY | Age: 45
Setting detail: THERAPIES SERIES
Discharge: HOME | End: 2025-07-18
Payer: MEDICARE

## 2025-07-18 PROCEDURE — 97530 THERAPEUTIC ACTIVITIES: CPT

## 2025-07-18 NOTE — PROGRESS NOTES
Physical Therapy: Discharge Note   Patient: Duncan Tripathi (44 y.o. male)   Examination Date: 2025  Plan of Care/Certification Expiration Date: 25    No data recorded   :  1980 # of Visits since SOC:   7   MRN: [id number]  CSN: [id number] Start of Care Date:   2025   Insurance: Payor: [facility]-MEDICARE ADVANTAGE / Plan: [facility]-MEDICARE ADVANTAGE / Product Type: *No Product type* /   Insurance ID: [id number] - (Medicare Managed) Secondary Insurance (if applicable):    Referring Physician: Pita Noyola MD Dr Rachelle Metz   PCP: Ozzie Ruelas MD Visits to Date/Visits Approved: 7 / 10    No Show/Cancelled Appts: 0 / 0     Medical Diagnosis: No admission diagnoses are documented for this encounter.    Treatment Diagnosis: pain, dec ROM and diff walking post L knee meniscal tear and arthroscopy        SUBJECTIVE EXAMINATION   Pain Level: Pain Screening  Patient Currently in Pain: Denies    Patient Comments: Subjective: Pt reports no c/o pain currently and ready for DC    HEP Compliance: Fair        OBJECTIVE EXAMINATION   Restrictions:  No data recorded No data recorded No data recorded          Left AROM  Right AROM      AROM LLE (degrees)  L Knee Flexion (0-145): 0-132 and up to 0-135 during a past tx session         Left Strength  Right Strength         Strength LLE  L Hip Flexion: 5/5  L Hip Extension: 5/5  L Hip ABduction: 4+/5  L Hip ADduction: 5/5, 4+/5  L Hip Internal Rotation: 4/5, 4+/5  L Hip External Rotation: 4/5, 4+/5  L Knee Flexion: 4+/5, 5/5  L Knee Extension: 4+/5, 5/5            TREATMENT     Gait: (CPT 59111)  Treatment Reasoning    Gait Training 2: Pt ambulated up/down a full flight of stairs reciprocally 1/10 sharp left knee pain                     Pt Education: Additional Comments: D/C'd to HEP       ASSESSMENT     Assessment: Assessment: Pt arrives to his appt with no c/o left knee pain and feels that he is ready for DC at this time. PT completed discharge

## 2025-07-21 ENCOUNTER — APPOINTMENT (OUTPATIENT)
Dept: PHYSICAL THERAPY | Age: 45
End: 2025-07-21
Payer: MEDICARE

## 2025-07-24 ENCOUNTER — APPOINTMENT (OUTPATIENT)
Dept: PHYSICAL THERAPY | Age: 45
End: 2025-07-24
Payer: MEDICARE

## 2025-08-07 ENCOUNTER — OFFICE VISIT (OUTPATIENT)
Dept: ORTHOPEDIC SURGERY | Age: 45
End: 2025-08-07

## 2025-08-07 VITALS
BODY MASS INDEX: 27.17 KG/M2 | TEMPERATURE: 97.9 F | WEIGHT: 205 LBS | HEIGHT: 73 IN | OXYGEN SATURATION: 99 % | HEART RATE: 65 BPM

## 2025-08-07 DIAGNOSIS — S83.252D BUCKET-HANDLE TEAR OF LATERAL MENISCUS OF LEFT KNEE AS CURRENT INJURY, SUBSEQUENT ENCOUNTER: Primary | ICD-10-CM

## 2025-08-07 PROCEDURE — 99024 POSTOP FOLLOW-UP VISIT: CPT | Performed by: STUDENT IN AN ORGANIZED HEALTH CARE EDUCATION/TRAINING PROGRAM

## 2025-08-29 RX ORDER — PAROXETINE 40 MG/1
40 TABLET, FILM COATED ORAL EVERY MORNING
Qty: 90 TABLET | Refills: 0 | Status: SHIPPED | OUTPATIENT
Start: 2025-08-29

## (undated) DEVICE — DRESSING GZ W1XL8IN COT XRFRM N ADH OVERWRAP CURAD

## (undated) DEVICE — GLOVE ORANGE PI 7   MSG9070

## (undated) DEVICE — TUBE IRRIG L8IN LNG PT W/ CONN FOR PMP SYS REDEUCE

## (undated) DEVICE — 4-PORT MANIFOLD: Brand: NEPTUNE 2

## (undated) DEVICE — NEEDLE SPNL 18GA L3.5IN W/ QNCKE SHARPER BVL DURA CLICK

## (undated) DEVICE — PAD,ABDOMINAL,8"X10",ST,LF: Brand: MEDLINE

## (undated) DEVICE — PADDING CAST W6INXL4YD POLY POR SPUN DACRON NON STERILE

## (undated) DEVICE — PADDING CAST W6INXL4YD RAYON UNDERCAST SOF-ROL

## (undated) DEVICE — BANDAGE COMPR M W4INXL10YD WHT BGE VELC E MTRX HK AND LOOP

## (undated) DEVICE — GLOVE ORANGE PI 7 1/2   MSG9075

## (undated) DEVICE — HYPODERMIC SAFETY NEEDLE: Brand: MAGELLAN

## (undated) DEVICE — LOWER EXTREMITY: Brand: MEDLINE INDUSTRIES, INC.

## (undated) DEVICE — GLOVE ORANGE PI 8 1/2   MSG9085

## (undated) DEVICE — STRIP,CLOSURE,WOUND,MEDI-STRIP,1/2X4: Brand: MEDLINE

## (undated) DEVICE — BLADE,CARBON-STEEL,11,STRL,DISPOSABLE,TB: Brand: MEDLINE

## (undated) DEVICE — SYRINGE MED 30ML STD CLR PLAS LUERLOCK TIP N CTRL DISP

## (undated) DEVICE — TUBING, SUCTION, 9/32" X 12', STRAIGHT: Brand: MEDLINE INDUSTRIES, INC.

## (undated) DEVICE — 3M™ STERI-DRAPE™ ARTHROSCOPY SHEET WITH POUCH 1194: Brand: STERI-DRAPE™

## (undated) DEVICE — SUTURE MONOCRYL SZ 3-0 L27IN ABSRB UD L19MM PS-2 3/8 CIR PRIM Y427H

## (undated) DEVICE — ZIMMER® STERILE DISPOSABLE TOURNIQUET CUFF, DUAL PORT, SINGLE BLADDER, 34 IN. (86 CM)

## (undated) DEVICE — [TOMCAT CUTTER, ARTHROSCOPIC SHAVER BLADE,  DO NOT RESTERILIZE,  DO NOT USE IF PACKAGE IS DAMAGED,  KEEP DRY,  KEEP AWAY FROM SUNLIGHT]: Brand: FORMULA

## (undated) DEVICE — GOWN,SIRUS,POLYRNF,BRTHSLV,XLN/XL,20/CS: Brand: MEDLINE